# Patient Record
Sex: MALE | Race: ASIAN | HISPANIC OR LATINO
[De-identification: names, ages, dates, MRNs, and addresses within clinical notes are randomized per-mention and may not be internally consistent; named-entity substitution may affect disease eponyms.]

---

## 2017-12-23 ENCOUNTER — APPOINTMENT (OUTPATIENT)
Dept: MRI IMAGING | Facility: HOSPITAL | Age: 72
End: 2017-12-23
Payer: MEDICARE

## 2017-12-23 ENCOUNTER — OUTPATIENT (OUTPATIENT)
Dept: OUTPATIENT SERVICES | Facility: HOSPITAL | Age: 72
LOS: 1 days | End: 2017-12-23
Payer: MEDICARE

## 2017-12-23 DIAGNOSIS — M47.22 OTHER SPONDYLOSIS WITH RADICULOPATHY, CERVICAL REGION: ICD-10-CM

## 2017-12-23 DIAGNOSIS — M50.122 CERVICAL DISC DISORDER AT C5-C6 LEVEL WITH RADICULOPATHY: ICD-10-CM

## 2017-12-23 DIAGNOSIS — M50.123 CERVICAL DISC DISORDER AT C6-C7 LEVEL WITH RADICULOPATHY: ICD-10-CM

## 2017-12-23 DIAGNOSIS — M50.121 CERVICAL DISC DISORDER AT C4-C5 LEVEL WITH RADICULOPATHY: ICD-10-CM

## 2017-12-23 DIAGNOSIS — M54.16 RADICULOPATHY, LUMBAR REGION: ICD-10-CM

## 2017-12-23 PROCEDURE — 72148 MRI LUMBAR SPINE W/O DYE: CPT

## 2017-12-23 PROCEDURE — 72148 MRI LUMBAR SPINE W/O DYE: CPT | Mod: 26

## 2017-12-23 PROCEDURE — 72141 MRI NECK SPINE W/O DYE: CPT | Mod: 26

## 2017-12-23 PROCEDURE — 72141 MRI NECK SPINE W/O DYE: CPT

## 2019-03-04 ENCOUNTER — APPOINTMENT (OUTPATIENT)
Age: 74
End: 2019-03-04
Payer: MEDICARE

## 2019-03-04 ENCOUNTER — OUTPATIENT (OUTPATIENT)
Dept: OUTPATIENT SERVICES | Facility: HOSPITAL | Age: 74
LOS: 1 days | End: 2019-03-04
Payer: MEDICARE

## 2019-03-04 DIAGNOSIS — G45.1 CAROTID ARTERY SYNDROME (HEMISPHERIC): ICD-10-CM

## 2019-03-04 PROCEDURE — 93880 EXTRACRANIAL BILAT STUDY: CPT | Mod: 26

## 2019-03-04 PROCEDURE — 93880 EXTRACRANIAL BILAT STUDY: CPT

## 2019-08-22 ENCOUNTER — APPOINTMENT (OUTPATIENT)
Dept: MRI IMAGING | Facility: HOSPITAL | Age: 74
End: 2019-08-22
Payer: MEDICARE

## 2019-08-22 ENCOUNTER — OUTPATIENT (OUTPATIENT)
Dept: OUTPATIENT SERVICES | Facility: HOSPITAL | Age: 74
LOS: 1 days | End: 2019-08-22
Payer: MEDICARE

## 2019-08-22 DIAGNOSIS — Z00.8 ENCOUNTER FOR OTHER GENERAL EXAMINATION: ICD-10-CM

## 2019-08-22 PROCEDURE — 72148 MRI LUMBAR SPINE W/O DYE: CPT

## 2019-08-22 PROCEDURE — 72148 MRI LUMBAR SPINE W/O DYE: CPT | Mod: 26

## 2020-07-22 ENCOUNTER — APPOINTMENT (OUTPATIENT)
Dept: MRI IMAGING | Facility: HOSPITAL | Age: 75
End: 2020-07-22
Payer: MEDICARE

## 2020-07-22 ENCOUNTER — OUTPATIENT (OUTPATIENT)
Dept: OUTPATIENT SERVICES | Facility: HOSPITAL | Age: 75
LOS: 1 days | End: 2020-07-22
Payer: MEDICARE

## 2020-07-22 DIAGNOSIS — M54.16 RADICULOPATHY, LUMBAR REGION: ICD-10-CM

## 2020-07-22 PROCEDURE — 72148 MRI LUMBAR SPINE W/O DYE: CPT

## 2020-07-22 PROCEDURE — 72148 MRI LUMBAR SPINE W/O DYE: CPT | Mod: 26

## 2020-11-16 ENCOUNTER — NON-APPOINTMENT (OUTPATIENT)
Age: 75
End: 2020-11-16

## 2020-11-18 ENCOUNTER — APPOINTMENT (OUTPATIENT)
Dept: NEUROLOGY | Facility: CLINIC | Age: 75
End: 2020-11-18
Payer: MEDICARE

## 2020-11-18 VITALS
RESPIRATION RATE: 16 BRPM | HEIGHT: 64 IN | HEART RATE: 71 BPM | BODY MASS INDEX: 29.37 KG/M2 | DIASTOLIC BLOOD PRESSURE: 89 MMHG | WEIGHT: 172 LBS | SYSTOLIC BLOOD PRESSURE: 162 MMHG

## 2020-11-18 VITALS — TEMPERATURE: 97.9 F

## 2020-11-18 DIAGNOSIS — H40.9 UNSPECIFIED GLAUCOMA: ICD-10-CM

## 2020-11-18 DIAGNOSIS — Z78.9 OTHER SPECIFIED HEALTH STATUS: ICD-10-CM

## 2020-11-18 DIAGNOSIS — Z80.9 FAMILY HISTORY OF MALIGNANT NEOPLASM, UNSPECIFIED: ICD-10-CM

## 2020-11-18 DIAGNOSIS — Z87.891 PERSONAL HISTORY OF NICOTINE DEPENDENCE: ICD-10-CM

## 2020-11-18 DIAGNOSIS — I10 ESSENTIAL (PRIMARY) HYPERTENSION: ICD-10-CM

## 2020-11-18 DIAGNOSIS — S06.9X9A UNSPECIFIED INTRACRANIAL INJURY WITH LOSS OF CONSCIOUSNESS OF UNSPECIFIED DURATION, INITIAL ENCOUNTER: ICD-10-CM

## 2020-11-18 DIAGNOSIS — Z82.49 FAMILY HISTORY OF ISCHEMIC HEART DISEASE AND OTHER DISEASES OF THE CIRCULATORY SYSTEM: ICD-10-CM

## 2020-11-18 DIAGNOSIS — E78.5 HYPERLIPIDEMIA, UNSPECIFIED: ICD-10-CM

## 2020-11-18 DIAGNOSIS — E11.9 TYPE 2 DIABETES MELLITUS W/OUT COMPLICATIONS: ICD-10-CM

## 2020-11-18 PROCEDURE — 99214 OFFICE O/P EST MOD 30 MIN: CPT

## 2020-11-18 RX ORDER — METFORMIN HYDROCHLORIDE 500 MG/1
500 TABLET, COATED ORAL TWICE DAILY
Refills: 0 | Status: ACTIVE | COMMUNITY

## 2020-11-18 RX ORDER — TAMSULOSIN HYDROCHLORIDE 0.4 MG/1
0.4 CAPSULE ORAL DAILY
Refills: 0 | Status: ACTIVE | COMMUNITY
Start: 1900-01-01 | End: 1900-01-01

## 2020-11-18 RX ORDER — LOSARTAN POTASSIUM 50 MG/1
50 TABLET, FILM COATED ORAL DAILY
Refills: 0 | Status: ACTIVE | COMMUNITY

## 2020-11-18 RX ORDER — ATORVASTATIN CALCIUM 10 MG/1
10 TABLET, FILM COATED ORAL DAILY
Refills: 0 | Status: ACTIVE | COMMUNITY

## 2020-11-18 NOTE — CONSULT LETTER
[Dear  ___] : Dear  [unfilled], [Consult Letter:] : I had the pleasure of evaluating your patient, [unfilled]. [Please see my note below.] : Please see my note below. [Consult Closing:] : Thank you very much for allowing me to participate in the care of this patient.  If you have any questions, please do not hesitate to contact me. [Sincerely,] : Sincerely, [FreeTextEntry3] : Cristian Garland MD\par  [DrCeleste  ___] : Dr. MARTINEZ

## 2020-11-18 NOTE — PHYSICAL EXAM
[FreeTextEntry1] : Constitutional:  Patient was well-developed, well-nourished and in no acute distress. \par \par Head:  Normocephalic, atraumatic. Tympanic membranes were clear. \par \par Neck:  Supple with full range of motion. \par \par Cardiovascular:  Cardiac rhythm was regular without murmur. There were no carotid bruits. Peripheral pulses were full and symmetric. \par \par Respiratory:  Lungs were clear. \par \par Abdomen:  Soft and nontender. \par \par Spine:  Nontender.  There was no pain on straight leg raising.  Wade's maneuver was negative.\par \par Skin:  There were no rashes. \par \par NEUROLOGICAL EXAMINATION:\par \par Mental Status: She was alert and oriented. Speech was fluent. There was no dysarthria. \par \par Cranial Nerves: \par \par II: Pupils were surgical.  He could finger count bilaterally.   Visual fields were full.  Fundi were not visualized.\par \par III, IV, VI:  Eye movements were full without nystagmus. \par \par V: Facial sensation was intact. \par \par VII: Facial strength was normal. \par \par VIII: Hearing was diminished bilaterally.\par \par IX, X: Palatal movement was normal. Phonation was normal. \par \par XI: Sternocleidomastoids and trapezii were normal. \par \par XII: Tongue was midline and movements normal. There was no lingual atrophy or fasciculations. \par \par Motor Examination: Muscle bulk, tone and strength were normal.  He had difficulty abducting his toes.  He was able to stand on his toes and heels without difficulty.\par \par Sensory Examination: Pinprick, vibration and joint position sense were intact. \par \par Reflexes: DTRs were 2 in the upper extremities, and 2+ at the knees.  The right ankle jerk was 2 and the left was 1+.\par \par Plantar Responses: Plantar responses were flexor. \par \par Coordination/Cerebellar Function: There was no dysmetria on finger to nose or heel to shin testing. \par \par Gait/Stance: Gait was normal. Romberg was negative.\par

## 2020-11-18 NOTE — HISTORY OF PRESENT ILLNESS
[FreeTextEntry1] : \par Mr. Nick Rainey was last evaluated on July 21, 2020 via telemedicine. He is a 75-year-old right-handed gentleman with lumbar and cervical spondylosis.  He was experiencing a recent exacerbation of left-sided lumbar radicular pain, tingling and weakness.  I suspected compromise of the left L4 and/or 5 nerve roots.\par \par MRI of the lumbar spine revealed severe stenosis at L4-5.  There was compression particularly of the left L5 nerve root.\par \par He was evaluated by Dr. Jin King, a  pain management specialist.  On September 10th, he underwent a lumbar epidural steroid injection.  He experienced transient pain relief.  His symptoms unfortunately recurred.  He describes mid low back pain radiating to the left buttock and down his leg to the foot.  His left leg feels weak.  He sometimes feels like falling.  His right leg is not affected.  He denies sphincteric difficulties.  His symptoms are worse when he rises in the morning and improve during the day.\par \par Dr. King has spoken to him about minimally invasive lumbar decompression.

## 2020-11-18 NOTE — ASSESSMENT
[FreeTextEntry1] : Mr. Rainey is a 75-year-old who suffers from severe lumbar spondylosis, stenosis and neurogenic claudication.  His motor and sensory systems appear intact.  I discussed treatment options including another lumbar epidural steroid injection, minimally invasive lumbar decompression and neurosurgical consultation for definitive decompression.  He wishes to pursue a more conservative approach.  He will consult with Dr. King regarding a repeat epidural or minimally invasive decompression.  He will be reevaluated in my office in 2 months.  Telephone contact will be maintained in the meantime.

## 2021-01-20 ENCOUNTER — APPOINTMENT (OUTPATIENT)
Dept: NEUROLOGY | Facility: CLINIC | Age: 76
End: 2021-01-20

## 2021-03-24 ENCOUNTER — APPOINTMENT (OUTPATIENT)
Dept: NEUROLOGY | Facility: CLINIC | Age: 76
End: 2021-03-24
Payer: MEDICARE

## 2021-03-24 VITALS
SYSTOLIC BLOOD PRESSURE: 147 MMHG | HEIGHT: 64 IN | HEART RATE: 76 BPM | BODY MASS INDEX: 29.02 KG/M2 | DIASTOLIC BLOOD PRESSURE: 83 MMHG | WEIGHT: 170 LBS

## 2021-03-24 PROCEDURE — 99213 OFFICE O/P EST LOW 20 MIN: CPT

## 2021-03-24 RX ORDER — DUTASTERIDE 0.5 MG/1
CAPSULE, LIQUID FILLED ORAL
Refills: 0 | Status: ACTIVE | COMMUNITY

## 2021-03-24 NOTE — PHYSICAL EXAM
[FreeTextEntry1] : Constitutional:  Patient was well-developed, well-nourished and in no acute distress. \par \par Head:  Normocephalic, atraumatic. Tympanic membranes were clear. \par \par Neck:  Supple with full range of motion. \par \par Cardiovascular:  Cardiac rhythm was regular without murmur. There were no carotid bruits. Peripheral pulses were full and symmetric. \par \par Respiratory:  Lungs were clear. \par \par Abdomen:  Soft and nontender. \par \par Spine:  Nontender.  There was no pain on straight leg raising.  Wade's maneuver was negative.\par \par Skin:  There were no rashes. \par \par NEUROLOGICAL EXAMINATION:\par \par Mental Status: She was alert and oriented. Speech was fluent. There was no dysarthria. \par \par Cranial Nerves: \par \par II: Pupils were surgical.  He could finger count bilaterally.   Visual fields were full.  Fundi were not visualized.\par \par III, IV, VI:  Eye movements were full without nystagmus. \par \par V: Facial sensation was intact. \par \par VII: Facial strength was normal. \par \par VIII: Hearing was diminished bilaterally.\par \par IX, X: Palatal movement was normal. Phonation was normal. \par \par XI: Sternocleidomastoids and trapezii were normal. \par \par XII: Tongue was midline and movements normal. There was no lingual atrophy or fasciculations. \par \par Motor Examination: Muscle bulk, tone and strength were normal.  He was able to stand on his toes and heels without difficulty.\par \par Sensory Examination: Pinprick, vibration and joint position sense were intact. \par \par Reflexes: DTRs were 2 in the upper extremities, and 2+ at the knees.  The right ankle jerk was 1+ in the left was absent.\par \par Plantar Responses: Plantar responses were flexor. \par \par Coordination/Cerebellar Function: There was no dysmetria on finger to nose or heel to shin testing. \par \par Gait/Stance: Gait was normal. Romberg was negative.\par

## 2021-03-24 NOTE — ASSESSMENT
[FreeTextEntry1] : Mr. Rainey is a 75-year-old who suffers from severe lumbar spondylosis, stenosis and neurogenic claudication.  Since his last lumbar epidural injection in December 2020, he seems to be doing fairly well.  He has only occasional tingling in a left lumbar radicular distribution.  I suggested cautious observation and continued back care and strengthening.  He will be reevaluated in 6 months.  Telephone contact will be maintained in the meantime.

## 2021-03-24 NOTE — HISTORY OF PRESENT ILLNESS
[FreeTextEntry1] : \par Mr. Nick Rainey was last evaluated on November 18, 2020. He is a 75-year-old right-handed gentleman with lumbar and cervical spondylosis.  He was experiencing a recent exacerbation of left-sided lumbar radicular pain, tingling and weakness.  I suspected compromise of the left L4 and/or 5 nerve roots.\par \par MRI of the lumbar spine revealed severe stenosis at L4-5.  There was compression particularly of the left L5 nerve root.\par \par He underwent a second lumbar epidural injection on December 3rd.  The first was performed on September 10th.  He seems to be doing fairly well.  He has occasional tingling in his left low back and down the leg.  His left leg is mildly weak.  He is performing his own exercise regimen.  He complains of nocturia and dry eyes.  Overall, he is doing well.

## 2021-09-23 ENCOUNTER — APPOINTMENT (OUTPATIENT)
Dept: MRI IMAGING | Facility: HOSPITAL | Age: 76
End: 2021-09-23
Payer: MEDICARE

## 2021-09-23 ENCOUNTER — OUTPATIENT (OUTPATIENT)
Dept: OUTPATIENT SERVICES | Facility: HOSPITAL | Age: 76
LOS: 1 days | End: 2021-09-23
Payer: MEDICARE

## 2021-09-23 DIAGNOSIS — Z00.8 ENCOUNTER FOR OTHER GENERAL EXAMINATION: ICD-10-CM

## 2021-09-23 PROCEDURE — 70553 MRI BRAIN STEM W/O & W/DYE: CPT | Mod: 26,MH

## 2021-09-23 PROCEDURE — 70553 MRI BRAIN STEM W/O & W/DYE: CPT | Mod: MH

## 2021-09-23 PROCEDURE — A9579: CPT

## 2021-09-29 ENCOUNTER — APPOINTMENT (OUTPATIENT)
Dept: NEUROLOGY | Facility: CLINIC | Age: 76
End: 2021-09-29
Payer: MEDICARE

## 2021-09-29 VITALS
WEIGHT: 170 LBS | DIASTOLIC BLOOD PRESSURE: 80 MMHG | HEART RATE: 61 BPM | BODY MASS INDEX: 29.02 KG/M2 | SYSTOLIC BLOOD PRESSURE: 160 MMHG | HEIGHT: 64 IN

## 2021-09-29 PROCEDURE — 99213 OFFICE O/P EST LOW 20 MIN: CPT

## 2021-09-29 NOTE — PHYSICAL EXAM
[FreeTextEntry1] : Constitutional:  Patient was well-developed, well-nourished and in no acute distress. \par \par Head:  Normocephalic, atraumatic. Tympanic membranes were clear. \par \par Neck:  Supple with full range of motion. \par \par Cardiovascular:  Cardiac rhythm was regular without murmur. There were no carotid bruits. Peripheral pulses were full and symmetric. \par \par Respiratory:  Lungs were clear. \par \par Abdomen:  Soft and nontender. \par \par Spine:  Nontender.  There was no pain on straight leg raising.  Wade's maneuver was negative.\par \par Skin:  There were no rashes. \par \par NEUROLOGICAL EXAMINATION:\par \par Mental Status: He was alert and oriented. Speech was fluent. There was no dysarthria. \par \par Cranial Nerves: \par \par II: Pupils were surgical.  He could finger count bilaterally.   Visual fields were full.  Fundi were not visualized.\par \par III, IV, VI:  Eye movements were full without nystagmus. \par \par V: Facial sensation was intact. \par \par VII: Facial strength was normal. \par \par VIII: Hearing was diminished bilaterally.\par \par IX, X: Palatal movement was normal. Phonation was normal. \par \par XI: Sternocleidomastoids and trapezii were normal. \par \par XII: Tongue was midline and movements normal. There was no lingual atrophy or fasciculations. \par \par Motor Examination: Muscle bulk, tone and strength were normal.  He was able to stand on his toes and heels without difficulty.\par \par Sensory Examination: Pinprick, vibration and joint position sense were intact. \par \par Reflexes: DTRs were 2 in the upper extremities, and 2+ at the knees.  The right ankle jerk was 1+ in the left was absent.\par \par Plantar Responses: Plantar responses were flexor. \par \par Coordination/Cerebellar Function: There was no dysmetria on finger to nose or heel to shin testing. \par \par Gait/Stance: Gait was normal. Romberg was negative. Tandem was mildly unsteady.\par

## 2021-09-29 NOTE — ASSESSMENT
[FreeTextEntry1] : Mr. Rainey is a 76-year-old who suffers from severe lumbar stenosis and radiculopathy and recent exacerbation of vestibular symptoms likely peripheral in etiology. He appears neurologically well compensated. I suggested a screening carotid Doppler, the last being performed 2-1/2 years ago. He will continue his cardiovascular prophylactic regimen. He will return to the office in 6 months for routine follow-up. Telephone contact will be maintained in the meantime.

## 2021-09-29 NOTE — HISTORY OF PRESENT ILLNESS
[FreeTextEntry1] : \par Mr. Nick Rainey returned to the office having been last seen on March 24, 2021. He is a 76-year-old right-handed gentleman with lumbar and cervical spondylosis.  He had been experiencing an exacerbation of left-sided lumbar radicular pain, tingling and weakness.  I suspected compromise of the left L4 and/or 5 nerve roots.  MRI of the lumbar spine revealed severe stenosis at L4-5.  There was compression particularly of the left L5 nerve root.\par \par He underwent a second lumbar epidural injection on December 3rd.  The first was performed on September 10th. His symptoms were much improved.\par \par He continues to do well from a lumbar spondylosis perspective. On occasion, he experiences low back discomfort upon awakening. He denies sciatic symptoms, weakness or numbness. He complains of nocturia x3 which he attributes to prostatism. He remains on tamsulosin and dutasteride.\par \par He experienced a recent bouts of vertigo which occurs in the morning. He has chronic hearing loss. He was evaluated by his otolaryngologist Dr. Adi Santana. 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 He underwent an MRI of the brain on September 23, 2021. That study revealed mild chronic bilateral inferior frontal encephalomalacia worse on the right likely due to traumatic brain injury. There was mild scattered chronic microvascular ischemic change in the periventricular white matter. His vestibular symptoms appear to have abated.

## 2021-09-29 NOTE — CONSULT LETTER
[Dear  ___] : Dear  [unfilled], [Consult Letter:] : I had the pleasure of evaluating your patient, [unfilled]. [Please see my note below.] : Please see my note below. [Consult Closing:] : Thank you very much for allowing me to participate in the care of this patient.  If you have any questions, please do not hesitate to contact me. [Sincerely,] : Sincerely, [FreeTextEntry3] : Cristian Garland MD\par  [DrCeleste  ___] : Dr. MARTINEZ [DrCeleste ___] : Dr. MARTINEZ

## 2021-10-19 ENCOUNTER — OUTPATIENT (OUTPATIENT)
Dept: OUTPATIENT SERVICES | Facility: HOSPITAL | Age: 76
LOS: 1 days | End: 2021-10-19
Payer: MEDICARE

## 2021-10-19 ENCOUNTER — APPOINTMENT (OUTPATIENT)
Dept: ULTRASOUND IMAGING | Facility: HOSPITAL | Age: 76
End: 2021-10-19
Payer: MEDICARE

## 2021-10-19 DIAGNOSIS — E11.9 TYPE 2 DIABETES MELLITUS WITHOUT COMPLICATIONS: ICD-10-CM

## 2021-10-19 DIAGNOSIS — E78.5 HYPERLIPIDEMIA, UNSPECIFIED: ICD-10-CM

## 2021-10-19 PROCEDURE — 93880 EXTRACRANIAL BILAT STUDY: CPT | Mod: 26

## 2021-10-19 PROCEDURE — 93880 EXTRACRANIAL BILAT STUDY: CPT

## 2021-10-22 ENCOUNTER — NON-APPOINTMENT (OUTPATIENT)
Age: 76
End: 2021-10-22

## 2022-01-08 ENCOUNTER — TRANSCRIPTION ENCOUNTER (OUTPATIENT)
Age: 77
End: 2022-01-08

## 2022-03-03 ENCOUNTER — INPATIENT (INPATIENT)
Facility: HOSPITAL | Age: 77
LOS: 3 days | Discharge: HOME | DRG: 696 | End: 2022-03-07
Attending: STUDENT IN AN ORGANIZED HEALTH CARE EDUCATION/TRAINING PROGRAM | Admitting: STUDENT IN AN ORGANIZED HEALTH CARE EDUCATION/TRAINING PROGRAM
Payer: MEDICARE

## 2022-03-03 VITALS
OXYGEN SATURATION: 98 % | RESPIRATION RATE: 17 BRPM | TEMPERATURE: 97 F | HEART RATE: 77 BPM | DIASTOLIC BLOOD PRESSURE: 106 MMHG | SYSTOLIC BLOOD PRESSURE: 182 MMHG | WEIGHT: 166.89 LBS

## 2022-03-03 DIAGNOSIS — R31.0 GROSS HEMATURIA: ICD-10-CM

## 2022-03-03 DIAGNOSIS — K59.00 CONSTIPATION, UNSPECIFIED: ICD-10-CM

## 2022-03-03 LAB
ALBUMIN SERPL ELPH-MCNC: 4.3 G/DL — SIGNIFICANT CHANGE UP (ref 3.3–5)
ALP SERPL-CCNC: 90 U/L — SIGNIFICANT CHANGE UP (ref 40–120)
ALT FLD-CCNC: 40 U/L — SIGNIFICANT CHANGE UP (ref 10–45)
ANION GAP SERPL CALC-SCNC: 6 MMOL/L — SIGNIFICANT CHANGE UP (ref 5–17)
AST SERPL-CCNC: 22 U/L — SIGNIFICANT CHANGE UP (ref 10–40)
BASOPHILS # BLD AUTO: 0.04 K/UL — SIGNIFICANT CHANGE UP (ref 0–0.2)
BASOPHILS NFR BLD AUTO: 0.6 % — SIGNIFICANT CHANGE UP (ref 0–2)
BILIRUB SERPL-MCNC: 0.6 MG/DL — SIGNIFICANT CHANGE UP (ref 0.2–1.2)
BUN SERPL-MCNC: 13 MG/DL — SIGNIFICANT CHANGE UP (ref 7–23)
CALCIUM SERPL-MCNC: 10.5 MG/DL — SIGNIFICANT CHANGE UP (ref 8.4–10.5)
CHLORIDE SERPL-SCNC: 104 MMOL/L — SIGNIFICANT CHANGE UP (ref 96–108)
CO2 SERPL-SCNC: 29 MMOL/L — SIGNIFICANT CHANGE UP (ref 22–31)
CREAT SERPL-MCNC: 0.97 MG/DL — SIGNIFICANT CHANGE UP (ref 0.5–1.3)
EGFR: 81 ML/MIN/1.73M2 — SIGNIFICANT CHANGE UP
EOSINOPHIL # BLD AUTO: 0.14 K/UL — SIGNIFICANT CHANGE UP (ref 0–0.5)
EOSINOPHIL NFR BLD AUTO: 2 % — SIGNIFICANT CHANGE UP (ref 0–6)
GLUCOSE BLDC GLUCOMTR-MCNC: 137 MG/DL — HIGH (ref 70–99)
GLUCOSE BLDC GLUCOMTR-MCNC: 170 MG/DL — HIGH (ref 70–99)
GLUCOSE SERPL-MCNC: 150 MG/DL — HIGH (ref 70–99)
HCT VFR BLD CALC: 42.9 % — SIGNIFICANT CHANGE UP (ref 39–50)
HCT VFR BLD CALC: 47.1 % — SIGNIFICANT CHANGE UP (ref 39–50)
HGB BLD-MCNC: 14.3 G/DL — SIGNIFICANT CHANGE UP (ref 13–17)
HGB BLD-MCNC: 15.5 G/DL — SIGNIFICANT CHANGE UP (ref 13–17)
IMM GRANULOCYTES NFR BLD AUTO: 0.1 % — SIGNIFICANT CHANGE UP (ref 0–1.5)
LYMPHOCYTES # BLD AUTO: 0.84 K/UL — LOW (ref 1–3.3)
LYMPHOCYTES # BLD AUTO: 11.8 % — LOW (ref 13–44)
MCHC RBC-ENTMCNC: 30.8 PG — SIGNIFICANT CHANGE UP (ref 27–34)
MCHC RBC-ENTMCNC: 31.6 PG — SIGNIFICANT CHANGE UP (ref 27–34)
MCHC RBC-ENTMCNC: 32.9 GM/DL — SIGNIFICANT CHANGE UP (ref 32–36)
MCHC RBC-ENTMCNC: 33.3 GM/DL — SIGNIFICANT CHANGE UP (ref 32–36)
MCV RBC AUTO: 93.5 FL — SIGNIFICANT CHANGE UP (ref 80–100)
MCV RBC AUTO: 94.9 FL — SIGNIFICANT CHANGE UP (ref 80–100)
MONOCYTES # BLD AUTO: 0.49 K/UL — SIGNIFICANT CHANGE UP (ref 0–0.9)
MONOCYTES NFR BLD AUTO: 6.9 % — SIGNIFICANT CHANGE UP (ref 2–14)
NEUTROPHILS # BLD AUTO: 5.57 K/UL — SIGNIFICANT CHANGE UP (ref 1.8–7.4)
NEUTROPHILS NFR BLD AUTO: 78.6 % — HIGH (ref 43–77)
NRBC # BLD: 0 /100 WBCS — SIGNIFICANT CHANGE UP (ref 0–0)
NRBC # BLD: 0 /100 WBCS — SIGNIFICANT CHANGE UP (ref 0–0)
PLATELET # BLD AUTO: 221 K/UL — SIGNIFICANT CHANGE UP (ref 150–400)
PLATELET # BLD AUTO: 224 K/UL — SIGNIFICANT CHANGE UP (ref 150–400)
POTASSIUM SERPL-MCNC: 5 MMOL/L — SIGNIFICANT CHANGE UP (ref 3.5–5.3)
POTASSIUM SERPL-SCNC: 5 MMOL/L — SIGNIFICANT CHANGE UP (ref 3.5–5.3)
PROT SERPL-MCNC: 8 G/DL — SIGNIFICANT CHANGE UP (ref 6–8.3)
RBC # BLD: 4.52 M/UL — SIGNIFICANT CHANGE UP (ref 4.2–5.8)
RBC # BLD: 5.04 M/UL — SIGNIFICANT CHANGE UP (ref 4.2–5.8)
RBC # FLD: 13.3 % — SIGNIFICANT CHANGE UP (ref 10.3–14.5)
RBC # FLD: 13.6 % — SIGNIFICANT CHANGE UP (ref 10.3–14.5)
SARS-COV-2 RNA SPEC QL NAA+PROBE: SIGNIFICANT CHANGE UP
SODIUM SERPL-SCNC: 139 MMOL/L — SIGNIFICANT CHANGE UP (ref 135–145)
WBC # BLD: 12 K/UL — HIGH (ref 3.8–10.5)
WBC # BLD: 7.09 K/UL — SIGNIFICANT CHANGE UP (ref 3.8–10.5)
WBC # FLD AUTO: 12 K/UL — HIGH (ref 3.8–10.5)
WBC # FLD AUTO: 7.09 K/UL — SIGNIFICANT CHANGE UP (ref 3.8–10.5)

## 2022-03-03 PROCEDURE — 71045 X-RAY EXAM CHEST 1 VIEW: CPT | Mod: 26

## 2022-03-03 PROCEDURE — 74177 CT ABD & PELVIS W/CONTRAST: CPT | Mod: 26,MA

## 2022-03-03 PROCEDURE — 93010 ELECTROCARDIOGRAM REPORT: CPT | Mod: 76

## 2022-03-03 PROCEDURE — 99223 1ST HOSP IP/OBS HIGH 75: CPT

## 2022-03-03 PROCEDURE — 99285 EMERGENCY DEPT VISIT HI MDM: CPT

## 2022-03-03 RX ORDER — METRONIDAZOLE 500 MG
500 TABLET ORAL ONCE
Refills: 0 | Status: DISCONTINUED | OUTPATIENT
Start: 2022-03-03 | End: 2022-03-03

## 2022-03-03 RX ORDER — SODIUM CHLORIDE 9 MG/ML
1000 INJECTION INTRAMUSCULAR; INTRAVENOUS; SUBCUTANEOUS ONCE
Refills: 0 | Status: COMPLETED | OUTPATIENT
Start: 2022-03-03 | End: 2022-03-03

## 2022-03-03 RX ORDER — IOHEXOL 300 MG/ML
30 INJECTION, SOLUTION INTRAVENOUS ONCE
Refills: 0 | Status: COMPLETED | OUTPATIENT
Start: 2022-03-03 | End: 2022-03-03

## 2022-03-03 RX ORDER — POLYETHYLENE GLYCOL 3350 17 G/17G
17 POWDER, FOR SOLUTION ORAL DAILY
Refills: 0 | Status: DISCONTINUED | OUTPATIENT
Start: 2022-03-03 | End: 2022-03-04

## 2022-03-03 RX ORDER — SENNA PLUS 8.6 MG/1
2 TABLET ORAL AT BEDTIME
Refills: 0 | Status: DISCONTINUED | OUTPATIENT
Start: 2022-03-03 | End: 2022-03-07

## 2022-03-03 RX ORDER — ATORVASTATIN CALCIUM 80 MG/1
10 TABLET, FILM COATED ORAL AT BEDTIME
Refills: 0 | Status: DISCONTINUED | OUTPATIENT
Start: 2022-03-03 | End: 2022-03-07

## 2022-03-03 RX ORDER — LOSARTAN POTASSIUM 100 MG/1
50 TABLET, FILM COATED ORAL DAILY
Refills: 0 | Status: DISCONTINUED | OUTPATIENT
Start: 2022-03-04 | End: 2022-03-07

## 2022-03-03 RX ORDER — MULTIVIT WITH MIN/MFOLATE/K2 340-15/3 G
1 POWDER (GRAM) ORAL ONCE
Refills: 0 | Status: COMPLETED | OUTPATIENT
Start: 2022-03-03 | End: 2022-03-03

## 2022-03-03 RX ORDER — TAMSULOSIN HYDROCHLORIDE 0.4 MG/1
0.4 CAPSULE ORAL AT BEDTIME
Refills: 0 | Status: DISCONTINUED | OUTPATIENT
Start: 2022-03-03 | End: 2022-03-04

## 2022-03-03 RX ORDER — MINERAL OIL
133 OIL (ML) MISCELLANEOUS ONCE
Refills: 0 | Status: COMPLETED | OUTPATIENT
Start: 2022-03-03 | End: 2022-03-03

## 2022-03-03 RX ORDER — CIPROFLOXACIN LACTATE 400MG/40ML
500 VIAL (ML) INTRAVENOUS ONCE
Refills: 0 | Status: DISCONTINUED | OUTPATIENT
Start: 2022-03-03 | End: 2022-03-03

## 2022-03-03 RX ORDER — ATORVASTATIN CALCIUM 80 MG/1
10 TABLET, FILM COATED ORAL AT BEDTIME
Refills: 0 | Status: DISCONTINUED | OUTPATIENT
Start: 2022-03-03 | End: 2022-03-03

## 2022-03-03 RX ADMIN — ATORVASTATIN CALCIUM 10 MILLIGRAM(S): 80 TABLET, FILM COATED ORAL at 23:03

## 2022-03-03 RX ADMIN — SENNA PLUS 2 TABLET(S): 8.6 TABLET ORAL at 19:13

## 2022-03-03 RX ADMIN — TAMSULOSIN HYDROCHLORIDE 0.4 MILLIGRAM(S): 0.4 CAPSULE ORAL at 19:13

## 2022-03-03 RX ADMIN — Medication 1 TABLET(S): at 15:26

## 2022-03-03 RX ADMIN — POLYETHYLENE GLYCOL 3350 17 GRAM(S): 17 POWDER, FOR SOLUTION ORAL at 19:13

## 2022-03-03 RX ADMIN — SODIUM CHLORIDE 1000 MILLILITER(S): 9 INJECTION INTRAMUSCULAR; INTRAVENOUS; SUBCUTANEOUS at 13:26

## 2022-03-03 RX ADMIN — Medication 1 BOTTLE: at 12:33

## 2022-03-03 RX ADMIN — IOHEXOL 30 MILLILITER(S): 300 INJECTION, SOLUTION INTRAVENOUS at 13:25

## 2022-03-03 RX ADMIN — Medication 133 MILLILITER(S): at 12:33

## 2022-03-03 NOTE — H&P ADULT - NSHPREVIEWOFSYSTEMS_GEN_ALL_CORE
REVIEW OF SYSTEMS:  CONSTITUTIONAL: No fever, weight loss, or fatigue  EYES: No eye pain, visual disturbances, or discharge  ENMT:  No difficulty hearing, tinnitus, vertigo; No sinus or throat pain  NECK: No pain or stiffness  BREASTS: No pain, masses, or nipple discharge  RESPIRATORY: No cough, wheezing, chills or hemoptysis; No shortness of breath  CARDIOVASCULAR: No chest pain, palpitations, dizziness, or leg swelling  GASTROINTESTINAL: No abdominal or epigastric pain. No nausea, vomiting, or hematemesis; No diarrhea or constipation. No melena or hematochezia.  GENITOURINARY: No dysuria, frequency, hematuria, or incontinence  NEUROLOGICAL: No headaches, memory loss, loss of strength, numbness, or tremors  SKIN: No itching, burning, rashes, or lesions   LYMPH NODES: No enlarged glands  ENDOCRINE: No heat or cold intolerance; No hair loss  MUSCULOSKELETAL: No joint pain or swelling; No muscle, back, or extremity pain  PSYCHIATRIC: No depression, anxiety, mood swings, or difficulty sleeping  HEME/LYMPH: No easy bruising, or bleeding gums  ALLERGY AND IMMUNOLOGIC: No hives or eczema    ALL ROS REVIEWED AND NORMAL EXCEPT AS STATED ABOVE REVIEW OF SYSTEMS:  CONSTITUTIONAL: No fever, weight loss, or fatigue  EYES: No eye pain, visual disturbances, or discharge  ENMT:  No difficulty hearing, tinnitus, vertigo; No sinus or throat pain  NECK: No pain or stiffness  BREASTS: No pain, masses, or nipple discharge  RESPIRATORY: No cough, wheezing, chills or hemoptysis; No shortness of breath  CARDIOVASCULAR: No chest pain, palpitations, dizziness, or leg swelling  GASTROINTESTINAL: +abdominal distention, No abdominal or epigastric pain. No nausea, vomiting, or hematemesis; No diarrhea or constipation. No melena or hematochezia.  GENITOURINARY: No dysuria, frequency, hematuria, or incontinence  NEUROLOGICAL: No headaches, memory loss, loss of strength, numbness, or tremors  SKIN: No itching, burning, rashes, or lesions   LYMPH NODES: No enlarged glands  ENDOCRINE: No heat or cold intolerance; No hair loss  MUSCULOSKELETAL: No joint pain or swelling; No muscle, back, or extremity pain  PSYCHIATRIC: No depression, anxiety, mood swings, or difficulty sleeping  HEME/LYMPH: No easy bruising, or bleeding gums  ALLERGY AND IMMUNOLOGIC: No hives or eczema    ALL ROS REVIEWED AND NORMAL EXCEPT AS STATED ABOVE

## 2022-03-03 NOTE — ED ADULT NURSE REASSESSMENT NOTE - NS ED NURSE REASSESS COMMENT FT1
Unsuccessful placement of guillen catheter as per MD orders after multiple attempts. MD Hanson made aware. Urology consulted. Will continue to monitor.

## 2022-03-03 NOTE — ED PROVIDER NOTE - CARE PROVIDER_API CALL
Amos Mendoza  UROLOGY  [address] [place] [address], [address]  [place], [place] [id number]  Phone: [phone]  Fax: [phone]  Follow Up Time:     Juan Diego Vanessa)  Gastroenterology; Internal Medicine  [address] [place] [address]  Phone: [phone]  Fax: [phone]  Follow Up Time:

## 2022-03-03 NOTE — ED ADULT TRIAGE NOTE - CHIEF COMPLAINT QUOTE
Pt c/o constipation x 1 week, has been having small hard stools Pt c/o constipation x 1 week, has been having small hard stools  ISAR positive

## 2022-03-03 NOTE — PATIENT PROFILE ADULT - FALL HARM RISK - HARM RISK INTERVENTIONS

## 2022-03-03 NOTE — H&P ADULT - NSHPLABSRESULTS_GEN_ALL_CORE
LABS:                        15.5   7.09  )-----------( 224      ( 03 Mar 2022 12:30 )             47.1     03-03    139  |  104  |  13  ----------------------------<  150<H>  5.0   |  29  |  0.97    Ca    10.5      03 Mar 2022 12:30    TPro  8.0  /  Alb  4.3  /  TBili  0.6  /  DBili  x   /  AST  22  /  ALT  40  /  AlkPhos  90  03-03         CAPILLARY BLOOD GLUCOSE                RADIOLOGY & ADDITIONAL TESTS:    Consultant(s) Notes Reviewed:  [x ] YES  [ ] NO  Care Discussed with Consultants/Other Providers [ x] YES  [ ] NO  Imaging Personally Reviewed:  [ ] YES  [ ] NO

## 2022-03-03 NOTE — ED PROVIDER NOTE - NSFOLLOWUPINSTRUCTIONS_ED_ALL_ED_FT
Follow up with your PMD within 1-2 days.  You will need to see both a Gastroenterologist (Colitis) and Urologist (Urinary retention) within the week- referrals above or you can call: Find a Physician helpline ([phone]) for assistance   Increase your fluids. Clear diet. Advance your diet slowly as tolerated.    Take Augmentin 875mg 1 tab 2x/day for 10 days.   Take Tylenol 650mg every 4-6 hours as needed for pain   Take all of your other medications as previously prescribed.  Worsening, continued or ANY new concerning symptoms return to the Emergency Department.      1. Colitis    WHAT YOU NEED TO KNOW:    Colitis is swelling and irritation of your colon. Colitis may be caused by ulcers or a problem with your immune system. Bacteria, a virus, or a parasite may also cause colitis. The cause may not be known. You may have diarrhea, abdominal pain, fever, or blood or mucus in your bowel movement.    DISCHARGE INSTRUCTIONS:    Return to the emergency department if:   •You have sudden trouble breathing.      •Your bowel movements are black or have blood in them.      •You have blood in your vomit.      •You have severe abdominal pain or your abdomen is swollen and feels hard.      •You have any of the following signs of dehydration: ?Dizziness or weakness      ?Dry mouth, cracked lips, or severe thirst      ?Fast heartbeat or breathing      ?Urinating very little or not at all        Call your doctor if:   •Your symptoms get worse or do not go away.      •You have a fever, chills, cough, or feel weak and achy.      •You suddenly lose weight without trying.      •You have questions or concerns about your condition or care.      Medicines:   •Medicines may be given to decrease inflammation in your colon and treat diarrhea.      •Take your medicine as directed. Contact your healthcare provider if you think your medicine is not helping or if you have side effects. Tell him of her if you are allergic to any medicine. Keep a list of the medicines, vitamins, and herbs you take. Include the amounts, and when and why you take them. Bring the list or the pill bottles to follow-up visits. Carry your medicine list with you in case of an emergency.      Manage your symptoms:   •Drink liquids as directed to help prevent dehydration. Good liquids to drink include water, juice, and broth. Ask how much liquid to drink each day. You may need to drink an oral rehydration solution (ORS). An ORS contains a balance of water, salt, and sugar to replace body fluids lost during diarrhea.      •Eat a variety of healthy foods. Healthy foods include fruits, vegetables, whole-grain breads, beans, low-fat dairy products, lean meats, and fish. You may need to eat several small meals throughout the day instead of large meals. Avoid spicy foods, caffeine, chocolate, and foods high in fat.      •Talk to your healthcare provider before you take NSAIDs. NSAIDs can cause worsen your symptoms if ulcers are causing your colitis.      •Start to exercise when you feel better. Regular exercise helps your bowels work normally. Ask about the best exercise plan for you.      Prevent the spread of germs:          •Wash your hands often. Wash your hands several times each day. Wash after you use the bathroom, change a child's diaper, and before you prepare or eat food. Use soap and water every time. Rub your soapy hands together, lacing your fingers. Wash the front and back of your hands, and in between your fingers. Use the fingers of one hand to scrub under the fingernails of the other hand. Wash for at least 20 seconds. Rinse with warm, running water for several seconds. Then dry your hands with a clean towel or paper towel. Use hand  that contains alcohol if soap and water are not available. Do not touch your eyes, nose, or mouth without washing your hands first.  Handwashing           •Cover a sneeze or cough. Use a tissue that covers your mouth and nose. Throw the tissue away in a trash can right away. Use the bend of your arm if a tissue is not available. Wash your hands well with soap and water or use a hand .      •Clean surfaces often. Clean doorknobs, countertops, cell phones, and other surfaces that are touched often. Use a disinfecting wipe, a single-use sponge, or a cloth you can wash and reuse. Use disinfecting  if you do not have wipes. You can create a disinfecting  by mixing 1 part bleach with 10 parts water.      •Ask about vaccines you may need. Vaccines help prevent disease caused by some viruses and bacteria. Get the influenza (flu) vaccine as soon as recommended each year. The flu vaccine is usually available starting in September or October. Flu viruses change, so it is important to get a flu vaccine every year. Get the pneumonia vaccine if recommended. This vaccine is usually recommended every 5 years. Your provider will tell you when to get this vaccine, if needed. Your healthcare provider can tell you if you should get other vaccines, and when to get them.      Follow up with your doctor as directed: You may need to return for a colonoscopy or other tests. Write down how often you have a bowel movements and what they look like. Bring this to your follow-up visits. Write down your questions so you remember to ask them during your visits.      2.   Acute Urinary Retention, Male       Acute urinary retention is when a person cannot pee (urinate) at all, or can only pee a little. This can come on all of a sudden. If it is not treated, it can lead to kidney problems or other serious problems.      What are the causes?    •A problem with the tube that drains the bladder (urethra).      •Problems with the nerves in the bladder.      •Tumors.      •Certain medicines.      •An infection.      •Having trouble pooping (constipation).        What increases the risk?    Older men are more at risk because their prostate gland may become larger as they age. Other conditions also can increase risk. These include:  •Diseases, such as multiple sclerosis.      •Injury to the spinal cord.      •Diabetes.      •A condition that affects the way the brain works, such as dementia.      •Holding back urine due to trauma or because you do not want to use the bathroom.        What are the signs or symptoms?    •Trouble peeing.      •Pain in the lower belly.        How is this treated?    Treatment for this condition may include:  •Medicines.      •Placing a thin, germ-free tube (catheter) into the bladder to drain pee out of the body.      •Therapy to treat mental health conditions.      •Treatment for conditions that may cause this.      If needed, you may be treated in the hospital for kidney problems or to manage other problems.      Follow these instructions at home:    Medicines     •Take over-the-counter and prescription medicines only as told by your doctor. Ask your doctor what medicines you should stay away from.       •If you were given an antibiotic medicine, take it as told by your doctor. Do not stop taking it, even if you start to feel better.      General instructions     • Do not smoke or use any products that contain nicotine or tobacco. If you need help quitting, ask your doctor.      •Drink enough fluid to keep your pee pale yellow.      •If you were sent home with a tube that drains the bladder, take care of it as told by your doctor.      •Watch for changes in your symptoms. Tell your doctor about them.      •If told, keep track of changes in your blood pressure at home. Tell your doctor about them.      •Keep all follow-up visits.        Contact a doctor if:    •You have spasms in your bladder that you cannot stop.      •You leak pee when you have spasms.        Get help right away if:    •You have chills or a fever.      •You have blood in your pee.    •You have a tube that drains pee from the bladder and these things happen:  •The tube stops draining pee.      •The tube falls out.          Summary    •Acute urinary retention is when you cannot pee at all or you pee too little.      •If this condition is not treated, it can lead to kidney problems or other serious problems.      •If you were sent home with a tube (catheter) that drains the bladder, take care of it as told by your doctor.      •Watch for changes in your symptoms. Tell your doctor about them.      This information is not intended to replace advice given to you by your health care provider. Make sure you discuss any questions you have with your health care provider. Follow up with your PMD within 1-2 days.    UROLOGY APPT- DR. MCALLISTER  THURSDAY 3/10 11am      You will need to see both a Gastroenterologist (Colitis) and Urologist (Urinary retention) within the week- referrals above or you can call: Find a Physician helpline ([phone]) for assistance   Increase your fluids. Clear diet. Advance your diet slowly as tolerated.    Take Augmentin 875mg 1 tab 2x/day for 10 days.   Take Tylenol 650mg every 4-6 hours as needed for pain   Take all of your other medications as previously prescribed.  Worsening, continued or ANY new concerning symptoms return to the Emergency Department.      1. Colitis    WHAT YOU NEED TO KNOW:    Colitis is swelling and irritation of your colon. Colitis may be caused by ulcers or a problem with your immune system. Bacteria, a virus, or a parasite may also cause colitis. The cause may not be known. You may have diarrhea, abdominal pain, fever, or blood or mucus in your bowel movement.    DISCHARGE INSTRUCTIONS:    Return to the emergency department if:   •You have sudden trouble breathing.      •Your bowel movements are black or have blood in them.      •You have blood in your vomit.      •You have severe abdominal pain or your abdomen is swollen and feels hard.      •You have any of the following signs of dehydration: ?Dizziness or weakness      ?Dry mouth, cracked lips, or severe thirst      ?Fast heartbeat or breathing      ?Urinating very little or not at all        Call your doctor if:   •Your symptoms get worse or do not go away.      •You have a fever, chills, cough, or feel weak and achy.      •You suddenly lose weight without trying.      •You have questions or concerns about your condition or care.      Medicines:   •Medicines may be given to decrease inflammation in your colon and treat diarrhea.      •Take your medicine as directed. Contact your healthcare provider if you think your medicine is not helping or if you have side effects. Tell him of her if you are allergic to any medicine. Keep a list of the medicines, vitamins, and herbs you take. Include the amounts, and when and why you take them. Bring the list or the pill bottles to follow-up visits. Carry your medicine list with you in case of an emergency.      Manage your symptoms:   •Drink liquids as directed to help prevent dehydration. Good liquids to drink include water, juice, and broth. Ask how much liquid to drink each day. You may need to drink an oral rehydration solution (ORS). An ORS contains a balance of water, salt, and sugar to replace body fluids lost during diarrhea.      •Eat a variety of healthy foods. Healthy foods include fruits, vegetables, whole-grain breads, beans, low-fat dairy products, lean meats, and fish. You may need to eat several small meals throughout the day instead of large meals. Avoid spicy foods, caffeine, chocolate, and foods high in fat.      •Talk to your healthcare provider before you take NSAIDs. NSAIDs can cause worsen your symptoms if ulcers are causing your colitis.      •Start to exercise when you feel better. Regular exercise helps your bowels work normally. Ask about the best exercise plan for you.      Prevent the spread of germs:          •Wash your hands often. Wash your hands several times each day. Wash after you use the bathroom, change a child's diaper, and before you prepare or eat food. Use soap and water every time. Rub your soapy hands together, lacing your fingers. Wash the front and back of your hands, and in between your fingers. Use the fingers of one hand to scrub under the fingernails of the other hand. Wash for at least 20 seconds. Rinse with warm, running water for several seconds. Then dry your hands with a clean towel or paper towel. Use hand  that contains alcohol if soap and water are not available. Do not touch your eyes, nose, or mouth without washing your hands first.  Handwashing           •Cover a sneeze or cough. Use a tissue that covers your mouth and nose. Throw the tissue away in a trash can right away. Use the bend of your arm if a tissue is not available. Wash your hands well with soap and water or use a hand .      •Clean surfaces often. Clean doorknobs, countertops, cell phones, and other surfaces that are touched often. Use a disinfecting wipe, a single-use sponge, or a cloth you can wash and reuse. Use disinfecting  if you do not have wipes. You can create a disinfecting  by mixing 1 part bleach with 10 parts water.      •Ask about vaccines you may need. Vaccines help prevent disease caused by some viruses and bacteria. Get the influenza (flu) vaccine as soon as recommended each year. The flu vaccine is usually available starting in September or October. Flu viruses change, so it is important to get a flu vaccine every year. Get the pneumonia vaccine if recommended. This vaccine is usually recommended every 5 years. Your provider will tell you when to get this vaccine, if needed. Your healthcare provider can tell you if you should get other vaccines, and when to get them.      Follow up with your doctor as directed: You may need to return for a colonoscopy or other tests. Write down how often you have a bowel movements and what they look like. Bring this to your follow-up visits. Write down your questions so you remember to ask them during your visits.      2.   Acute Urinary Retention, Male       Acute urinary retention is when a person cannot pee (urinate) at all, or can only pee a little. This can come on all of a sudden. If it is not treated, it can lead to kidney problems or other serious problems.      What are the causes?    •A problem with the tube that drains the bladder (urethra).      •Problems with the nerves in the bladder.      •Tumors.      •Certain medicines.      •An infection.      •Having trouble pooping (constipation).        What increases the risk?    Older men are more at risk because their prostate gland may become larger as they age. Other conditions also can increase risk. These include:  •Diseases, such as multiple sclerosis.      •Injury to the spinal cord.      •Diabetes.      •A condition that affects the way the brain works, such as dementia.      •Holding back urine due to trauma or because you do not want to use the bathroom.        What are the signs or symptoms?    •Trouble peeing.      •Pain in the lower belly.        How is this treated?    Treatment for this condition may include:  •Medicines.      •Placing a thin, germ-free tube (catheter) into the bladder to drain pee out of the body.      •Therapy to treat mental health conditions.      •Treatment for conditions that may cause this.      If needed, you may be treated in the hospital for kidney problems or to manage other problems.      Follow these instructions at home:    Medicines     •Take over-the-counter and prescription medicines only as told by your doctor. Ask your doctor what medicines you should stay away from.       •If you were given an antibiotic medicine, take it as told by your doctor. Do not stop taking it, even if you start to feel better.      General instructions     • Do not smoke or use any products that contain nicotine or tobacco. If you need help quitting, ask your doctor.      •Drink enough fluid to keep your pee pale yellow.      •If you were sent home with a tube that drains the bladder, take care of it as told by your doctor.      •Watch for changes in your symptoms. Tell your doctor about them.      •If told, keep track of changes in your blood pressure at home. Tell your doctor about them.      •Keep all follow-up visits.        Contact a doctor if:    •You have spasms in your bladder that you cannot stop.      •You leak pee when you have spasms.        Get help right away if:    •You have chills or a fever.      •You have blood in your pee.    •You have a tube that drains pee from the bladder and these things happen:  •The tube stops draining pee.      •The tube falls out.          Summary    •Acute urinary retention is when you cannot pee at all or you pee too little.      •If this condition is not treated, it can lead to kidney problems or other serious problems.      •If you were sent home with a tube (catheter) that drains the bladder, take care of it as told by your doctor.      •Watch for changes in your symptoms. Tell your doctor about them.      This information is not intended to replace advice given to you by your health care provider. Make sure you discuss any questions you have with your health care provider.

## 2022-03-03 NOTE — ED PROVIDER NOTE - CARE PLAN
Principal Discharge DX:	Constipation   1 Principal Discharge DX:	Constipation  Secondary Diagnosis:	Urinary retention  Secondary Diagnosis:	Colitis   Principal Discharge DX:	Constipation  Secondary Diagnosis:	Urinary retention  Secondary Diagnosis:	Colitis  Secondary Diagnosis:	Hematuria

## 2022-03-03 NOTE — ED PROVIDER NOTE - NSICDXPASTSURGICALHX_GEN_ALL_CORE_FT
Activities :  -Met with pt and discussed his care   -Rounded with team     -passionate care assisted living- Declined patient.      [facility] called Jessica and requested an update regarding placements and the status of the patient's cadi waiver.     [facility] received a voicemail from jessica stating the patient's waiver was closed yesterday. She stated that there is nothing she can do to help him and won't be able to assist with placements.     -[facility] returned Jessica's voicemail and requested a call back to discuss how the patient's CADI can be reinstated.     [facility] notified patient that his waiver has ended. Writer encouraged him to also reach out to jessica.      Addressed patient needs/concerns: Reviewed possible options for disposition, when he is stabilized.      Discharge Plan or Goal   Plan  New referrals made to assisted living facility. The new assisted living facility is currently reviewing the pt's paperwork.  Commitment    Health Care Follow up Appointment:    Will need psych, therapy, pcp, and  mental michael cm  Medication Management Plan:  See providers notes  Housing:  [facility] #[phone]  Financial Follow ups:  He reports he has GA   SMRTed for SSDI benefits.  Care Team     Jr Giron MD  -[facility] #[phone]    CADI Waiver CM: Jessica Lawson- [facility] #[phone]. [email or web address]  ? Arlin- supervisor for cadi cm [phone]    Joseph Oliveros's supervisor # [phone]  ? Admin #[phone]     PAST SURGICAL HISTORY:  No significant past surgical history

## 2022-03-03 NOTE — H&P ADULT - ASSESSMENT
76M with PMH of DMII, HTN, BPH s/p TURP, no hx of abdominal surgeries accompanied by wife p/w 1 day of constipation with hard pellets, normal flatus, last urination this AM now presenting with abdominal distention. Pt denies fever, chills, cp, sob, palpitations, n/v/d, dysuria. Attempted manual disimpaction in ED unsuccessfully. Senna and prune juice with no relief. Kramer catheter was attempted but failed. Coude catheter was placed and pt had hematuria. Seen by urology. Admitted for monitoring of H/H and clots.      Acute urinary retention  Hematuria  BPH s/p TURP  - Coude placed in ED  - Urology consulted  - monitor H/H  - UA    Hypertension      DMII  - metformin at home  - A1c pending  - ISS for now    GI ppx    DVT ppx  - IPC  - no pharm ppx due to hematuria  - encourage ambulation    discussed with wife at bedside and she is in agreement with treatment plan      76M with PMH of DMII, HTN, BPH s/p TURP, no hx of abdominal surgeries accompanied by wife p/w 1 day of constipation with hard pellets, normal flatus, last urination this AM now presenting with abdominal distention. Pt denies fever, chills, cp, sob, palpitations, n/v/d, dysuria. Attempted manual disimpaction in ED unsuccessfully. Senna and prune juice with no relief. Kramer catheter was attempted but failed. Coude catheter was placed and pt had hematuria. Seen by urology. Admitted for monitoring of H/H and clots.    Acute urinary retention  Hematuria  BPH s/p TURP  - Coude placed in ED  - Urology consulted  - monitor H/H  - UA not sent yet as pt is having beatrice hematuria  - continue tamsulosin 0.4mg nightly    Hypertension  - losartan 50mg daily    DMII  - metformin at home  - A1c pending  - ISS for now    DVT ppx  - IPC  - no pharm ppx due to hematuria  - encourage ambulation    discussed with wife at bedside and she is in agreement with treatment plan

## 2022-03-03 NOTE — CONSULT NOTE ADULT - PROBLEM SELECTOR RECOMMENDATION 9
keep guillen catheter ... irrigate q 1 hour prn with 50ml saline.. may need to advance guillen with lubricant or deflate balloon to facilitate irrigate. Do not remove guillen.    continue flomax and finasteride. treat constipation. delayed void trial

## 2022-03-03 NOTE — ED PROVIDER NOTE - PROGRESS NOTE DETAILS
Will recheck BP after pt is more comfortable as elevated BP thought to be related to his pain unable to insert guillen. multiple attempts. discussed with dr. lambert and will come to the ED when he is done with office hours

## 2022-03-03 NOTE — CHART NOTE - NSCHARTNOTEFT_GEN_A_CORE
Patient presents to the ER in UR, called by Dr. Mendoza to evaluate patient and place guillen catheter.   Patient c/o suprapubic pain, and unable to urinate since this morning. States he came to the ER due to UR and Constipation.   Guillen catheter insertion attempted by ER staff but unsuccessful.   On exam, bladder is distended and pt with SP tenderness. Blood noted at meatus.   18F coude inserted using sterile technique, immediate return of bloody urine, 1000cc urine output. No clots.   Pt tolerated well and states he is feeling better.   Discussed with Dr. Mendoza who will be coming to see the patient. Full consult to follow.     Meryl Ibarra   Surgical PA Patient presents to the ER in UR, called by Dr. Mendoza to evaluate patient and place guillen catheter.   Patient c/o suprapubic pain, and unable to urinate since this morning. States he came to the ER due to UR and Constipation. States he has a history of BPH and takes Flomax QHS, Dr. Mendoza is his Urologist.   Guillen catheter insertion attempted by ER staff but unsuccessful.     On exam, bladder is distended and pt with SP tenderness. Blood noted at meatus.     18F coude inserted using sterile technique, immediate return of bloody urine, 1000cc urine output. No clots.   Pt tolerated well and states he is feeling better.     Discussed with Dr. Mendoza who will be coming to see the patient. Full consult to follow.     Meryl Ibarra   Surgical PA

## 2022-03-03 NOTE — ED ADULT NURSE NOTE - NSICDXPASTMEDICALHX_GEN_ALL_CORE_FT
PAST MEDICAL HISTORY:  BPH (Benign Prostatic Hypertrophy)     Diabetes Mellitus Type II     History of Cataract Surgery     Hyperlipemia

## 2022-03-03 NOTE — ED PROVIDER NOTE - CARE PROVIDERS DIRECT ADDRESSES
[email or web address]

## 2022-03-03 NOTE — ED PROVIDER NOTE - OBJECTIVE STATEMENT
Dr. Hanson: 76M h/o DM on metformin, HTN, no abdo surgeries, accompanied by wife p/w 1 day of constipation with hard pellets, normal flatus, last urination this AM, feels distended. No nausea, vomiting, fevers, chills, dysuria. Tried manual disimpaction, Senna and prune juice with no relief.

## 2022-03-03 NOTE — ED ADULT NURSE REASSESSMENT NOTE - NS ED NURSE REASSESS COMMENT FT1
Bladder scanned pt as per MD orders. Residual 784mL, pt voided 50mL after bladder scan. Pt c/o of "fullness and pressure" in abd region. MD Hanson made aware. Will continue to monitor.

## 2022-03-03 NOTE — ED PROVIDER NOTE - CLINICAL SUMMARY MEDICAL DECISION MAKING FREE TEXT BOX
Pt with no surgical hx p/w constipation, benign abdo exam, hard stool in vault, unable to disimpact, will get bladder scan, fleets, mag citrate, abdo xray to r/o obvious sbo, reassess Pt with no surgical hx p/w constipation, benign abdo exam, hard stool in vault, unable to disimpact, will get bladder scan, fleets, mag citrate, abdo xray to r/o obvious sbo, reassess  Unable to obtain guillen with multiple attempts, unable to get guillen, will consult urology Pt with no surgical hx p/w constipation, benign abdo exam, hard stool in vault, unable to disimpact, will get bladder scan, fleets, mag citrate, abdo xray to r/o obvious sbo, reassess  Unable to obtain guillen with multiple attempts, unable to get guillen, will consult urology  Surgical KASSANDRA Sheth placed guillen and drained 1000ml bloody urine, no CBI recommended, Dr. Mendoza to see pt shortly. Pt with no surgical hx p/w constipation, benign abdo exam, hard stool in vault, unable to disimpact, will get bladder scan, fleets, mag citrate, abdo xray to r/o obvious sbo, reassess  Unable to obtain guillen with multiple attempts, unable to get guillen, will consult urology  Surgical KASSANDRA Sheth placed guillen and drained 1000ml bloody urine, no CBI recommended, Dr. Mendoza to see pt shortly.  Dr. Mendoza saw pt and recommends admission for monitoring given hematuria

## 2022-03-03 NOTE — ED PROVIDER NOTE - PATIENT PORTAL LINK FT
You can access the FollowMyHealth Patient Portal offered by [facility] by registering at the following website: [email or web address]. By joining [facility]’s FollowMyHealth portal, you will also be able to view your health information using other applications (apps) compatible with our system.

## 2022-03-03 NOTE — CONSULT NOTE ADULT - SUBJECTIVE AND OBJECTIVE BOX
Patient is a 76y old  Male who presents with a chief complaint of constipation and urinary retention. history of TURP in past. continues on flomax and proscar. Unable to void. RN unable to place guillen... placed by surgical PA 1000ml bloody urine    HPI: see above      PAST MEDICAL & SURGICAL HISTORY:  Diabetes Mellitus Type II    Hyperlipemia    History of Cataract Surgery    BPH (Benign Prostatic Hypertrophy)    No significant past surgical history        REVIEW OF SYSTEMS:    CONSTITUTIONAL:  no fever or chills  HEENT: No visual changes  ENDO: No sweating  NECK: No pain or stiffness  MUSCULOSKELETAL: No back pain, no joint pain  RESPIRATORY: No shortness of breath  CARDIOVASCULAR: No chest pain  GI YES constipation and pain  NEUROLOGICAL: No mental status changes  PSYCH: No depression, no mood changes  SKIN: No itching      MEDICATIONS  (STANDING):    MEDICATIONS  (PRN):      Allergies    No Known Allergies    Intolerances        SOCIAL HISTORY: No illicit drug use    FAMILY HISTORY:        T(C): 37.7 (03-03-22 @ 18:02), Max: 37.7 (03-03-22 @ 18:02)  T(F): 99.8 (03-03-22 @ 18:02), Max: 99.8 (03-03-22 @ 18:02)  HR: 100 (03-03-22 @ 18:02) (77 - 100)  BP: 128/78 (03-03-22 @ 18:02) (128/78 - 182/106)  RR: 17 (03-03-22 @ 11:49) (17 - 17)  SpO2: 96% (03-03-22 @ 18:02) (96% - 98%)      PHYSICAL EXAM:    Constitutional: alert, no acute distress  Back: No CVA tenderness  Respiratory: No accessory respiratory muscle use  Abd:  distended  no organomegaly  no hernia  : no bladder distention, guillen bloody, testes benign  Extremities: no edema  Neurological: A/O x 3  Psychiatric: Normal mood, normal affect  Skin: No rashes          Weight (kg): 75.7 (03-03-22 @ 11:49)    LABS:                        15.5   7.09  )-----------( 224      ( 03 Mar 2022 12:30 )             47.1                 RADIOLOGY & ADDITIONAL STUDIES:

## 2022-03-03 NOTE — H&P ADULT - HISTORY OF PRESENT ILLNESS
76M with PMH of DMII, HTN, BPH s/p TURP, no hx of abdominal surgeries accompanied by wife p/w 1 day of constipation with hard pellets, normal flatus, last urination this AM now presenting with abdominal distention. Pt denies fever, chills, cp, sob, palpitations, n/v/d, dysuria. Attempted manual disimpaction in ED unsuccessfully. Senna and prune juice with no relief. Kramer catheter was attempted but failed. Coude catheter was placed and pt had hematuria. Seen by urology. Admitted for monitoring of H/H and clots.

## 2022-03-03 NOTE — CONSULT NOTE ADULT - ASSESSMENT
Urinary retention with traumatic guillen catheter    Catheter irrigated by me with 500ml saline, few small clots, sterile technique

## 2022-03-03 NOTE — ED PROVIDER NOTE - ATTENDING CONTRIBUTION TO CARE
Dr. Hanson: I performed a face to face bedside interview with patient regarding history of present illness, review of symptoms and past medical history. I completed an independent physical exam.  I have discussed patient's plan of care with PA.   I agree with note as stated above, having amended the EMR as needed to reflect my findings.   This includes HISTORY OF PRESENT ILLNESS, HIV, PAST MEDICAL/SURGICAL/FAMILY/SOCIAL HISTORY, ALLERGIES AND HOME MEDICATIONS, REVIEW OF SYSTEMS, PHYSICAL EXAM, and any PROGRESS NOTES during the time I functioned as the attending physician for this patient.    Dr. Hanson: This H&P has been written by myself in its entirety

## 2022-03-03 NOTE — PATIENT PROFILE ADULT - NSPROEXTENSIONSOFSELF_GEN_A_NUR
pt did not bring a cane at this admission./cane/eyeglasses pt did not bring a cane at this admission.  pt has a cell phone./cane/eyeglasses

## 2022-03-04 DIAGNOSIS — R33.9 RETENTION OF URINE, UNSPECIFIED: ICD-10-CM

## 2022-03-04 LAB
A1C WITH ESTIMATED AVERAGE GLUCOSE RESULT: 6.4 % — HIGH (ref 4–5.6)
ALBUMIN SERPL ELPH-MCNC: 3.8 G/DL — SIGNIFICANT CHANGE UP (ref 3.3–5)
ALP SERPL-CCNC: 75 U/L — SIGNIFICANT CHANGE UP (ref 40–120)
ALT FLD-CCNC: 31 U/L — SIGNIFICANT CHANGE UP (ref 10–45)
ANION GAP SERPL CALC-SCNC: 10 MMOL/L — SIGNIFICANT CHANGE UP (ref 5–17)
AST SERPL-CCNC: 18 U/L — SIGNIFICANT CHANGE UP (ref 10–40)
BILIRUB SERPL-MCNC: 1 MG/DL — SIGNIFICANT CHANGE UP (ref 0.2–1.2)
BLD GP AB SCN SERPL QL: SIGNIFICANT CHANGE UP
BUN SERPL-MCNC: 13 MG/DL — SIGNIFICANT CHANGE UP (ref 7–23)
CALCIUM SERPL-MCNC: 9.8 MG/DL — SIGNIFICANT CHANGE UP (ref 8.4–10.5)
CHLORIDE SERPL-SCNC: 105 MMOL/L — SIGNIFICANT CHANGE UP (ref 96–108)
CO2 SERPL-SCNC: 25 MMOL/L — SIGNIFICANT CHANGE UP (ref 22–31)
CREAT SERPL-MCNC: 1.14 MG/DL — SIGNIFICANT CHANGE UP (ref 0.5–1.3)
EGFR: 67 ML/MIN/1.73M2 — SIGNIFICANT CHANGE UP
ESTIMATED AVERAGE GLUCOSE: 137 MG/DL — HIGH (ref 68–114)
GLUCOSE BLDC GLUCOMTR-MCNC: 139 MG/DL — HIGH (ref 70–99)
GLUCOSE BLDC GLUCOMTR-MCNC: 184 MG/DL — HIGH (ref 70–99)
GLUCOSE SERPL-MCNC: 194 MG/DL — HIGH (ref 70–99)
HCT VFR BLD CALC: 36.4 % — LOW (ref 39–50)
HCT VFR BLD CALC: 40.4 % — SIGNIFICANT CHANGE UP (ref 39–50)
HCV AB S/CO SERPL IA: 0.07 S/CO — SIGNIFICANT CHANGE UP (ref 0–0.99)
HCV AB SERPL-IMP: SIGNIFICANT CHANGE UP
HGB BLD-MCNC: 12.2 G/DL — LOW (ref 13–17)
HGB BLD-MCNC: 13.4 G/DL — SIGNIFICANT CHANGE UP (ref 13–17)
MCHC RBC-ENTMCNC: 30.5 PG — SIGNIFICANT CHANGE UP (ref 27–34)
MCHC RBC-ENTMCNC: 31.4 PG — SIGNIFICANT CHANGE UP (ref 27–34)
MCHC RBC-ENTMCNC: 33.2 GM/DL — SIGNIFICANT CHANGE UP (ref 32–36)
MCHC RBC-ENTMCNC: 33.5 GM/DL — SIGNIFICANT CHANGE UP (ref 32–36)
MCV RBC AUTO: 92 FL — SIGNIFICANT CHANGE UP (ref 80–100)
MCV RBC AUTO: 93.8 FL — SIGNIFICANT CHANGE UP (ref 80–100)
NRBC # BLD: 0 /100 WBCS — SIGNIFICANT CHANGE UP (ref 0–0)
NRBC # BLD: 0 /100 WBCS — SIGNIFICANT CHANGE UP (ref 0–0)
PLATELET # BLD AUTO: 216 K/UL — SIGNIFICANT CHANGE UP (ref 150–400)
PLATELET # BLD AUTO: 238 K/UL — SIGNIFICANT CHANGE UP (ref 150–400)
POTASSIUM SERPL-MCNC: 4.2 MMOL/L — SIGNIFICANT CHANGE UP (ref 3.5–5.3)
POTASSIUM SERPL-SCNC: 4.2 MMOL/L — SIGNIFICANT CHANGE UP (ref 3.5–5.3)
PROT SERPL-MCNC: 7 G/DL — SIGNIFICANT CHANGE UP (ref 6–8.3)
RBC # BLD: 3.88 M/UL — LOW (ref 4.2–5.8)
RBC # BLD: 4.39 M/UL — SIGNIFICANT CHANGE UP (ref 4.2–5.8)
RBC # FLD: 13.4 % — SIGNIFICANT CHANGE UP (ref 10.3–14.5)
RBC # FLD: 13.7 % — SIGNIFICANT CHANGE UP (ref 10.3–14.5)
SODIUM SERPL-SCNC: 140 MMOL/L — SIGNIFICANT CHANGE UP (ref 135–145)
WBC # BLD: 10.87 K/UL — HIGH (ref 3.8–10.5)
WBC # BLD: 9.87 K/UL — SIGNIFICANT CHANGE UP (ref 3.8–10.5)
WBC # FLD AUTO: 10.87 K/UL — HIGH (ref 3.8–10.5)
WBC # FLD AUTO: 9.87 K/UL — SIGNIFICANT CHANGE UP (ref 3.8–10.5)

## 2022-03-04 PROCEDURE — 99233 SBSQ HOSP IP/OBS HIGH 50: CPT

## 2022-03-04 RX ORDER — SOD SULF/SODIUM/NAHCO3/KCL/PEG
1000 SOLUTION, RECONSTITUTED, ORAL ORAL ONCE
Refills: 0 | Status: COMPLETED | OUTPATIENT
Start: 2022-03-04 | End: 2022-03-04

## 2022-03-04 RX ORDER — ATORVASTATIN CALCIUM 80 MG/1
1 TABLET, FILM COATED ORAL
Qty: 0 | Refills: 0 | DISCHARGE

## 2022-03-04 RX ORDER — LOSARTAN POTASSIUM 100 MG/1
1 TABLET, FILM COATED ORAL
Qty: 0 | Refills: 0 | DISCHARGE

## 2022-03-04 RX ORDER — TAMSULOSIN HYDROCHLORIDE 0.4 MG/1
0.8 CAPSULE ORAL AT BEDTIME
Refills: 0 | Status: DISCONTINUED | OUTPATIENT
Start: 2022-03-04 | End: 2022-03-07

## 2022-03-04 RX ORDER — SODIUM CHLORIDE 9 MG/ML
1000 INJECTION, SOLUTION INTRAVENOUS
Refills: 0 | Status: DISCONTINUED | OUTPATIENT
Start: 2022-03-04 | End: 2022-03-04

## 2022-03-04 RX ORDER — POLYETHYLENE GLYCOL 3350 17 G/17G
17 POWDER, FOR SOLUTION ORAL ONCE
Refills: 0 | Status: COMPLETED | OUTPATIENT
Start: 2022-03-04 | End: 2022-03-04

## 2022-03-04 RX ORDER — METFORMIN HYDROCHLORIDE 850 MG/1
1 TABLET ORAL
Qty: 0 | Refills: 0 | DISCHARGE

## 2022-03-04 RX ORDER — FINASTERIDE 5 MG/1
5 TABLET, FILM COATED ORAL DAILY
Refills: 0 | Status: DISCONTINUED | OUTPATIENT
Start: 2022-03-04 | End: 2022-03-07

## 2022-03-04 RX ORDER — POLYETHYLENE GLYCOL 3350 17 G/17G
17 POWDER, FOR SOLUTION ORAL
Refills: 0 | Status: DISCONTINUED | OUTPATIENT
Start: 2022-03-04 | End: 2022-03-07

## 2022-03-04 RX ADMIN — SENNA PLUS 2 TABLET(S): 8.6 TABLET ORAL at 22:20

## 2022-03-04 RX ADMIN — POLYETHYLENE GLYCOL 3350 17 GRAM(S): 17 POWDER, FOR SOLUTION ORAL at 17:33

## 2022-03-04 RX ADMIN — Medication 5 MILLIGRAM(S): at 17:30

## 2022-03-04 RX ADMIN — Medication 1000 MILLILITER(S): at 12:55

## 2022-03-04 RX ADMIN — FINASTERIDE 5 MILLIGRAM(S): 5 TABLET, FILM COATED ORAL at 11:50

## 2022-03-04 RX ADMIN — LOSARTAN POTASSIUM 50 MILLIGRAM(S): 100 TABLET, FILM COATED ORAL at 07:16

## 2022-03-04 RX ADMIN — ATORVASTATIN CALCIUM 10 MILLIGRAM(S): 80 TABLET, FILM COATED ORAL at 22:20

## 2022-03-04 RX ADMIN — TAMSULOSIN HYDROCHLORIDE 0.8 MILLIGRAM(S): 0.4 CAPSULE ORAL at 22:20

## 2022-03-04 RX ADMIN — SODIUM CHLORIDE 75 MILLILITER(S): 9 INJECTION, SOLUTION INTRAVENOUS at 07:16

## 2022-03-04 RX ADMIN — Medication 5 MILLIGRAM(S): at 11:51

## 2022-03-04 NOTE — PROGRESS NOTE ADULT - SUBJECTIVE AND OBJECTIVE BOX
Patient is a 76y old  Male who presents with a chief complaint of Hematuria (04 Mar 2022 07:45)  RRT called last night for presyncope while walking to bathroom  Pt with active hematuria  Eager to go home   Reports some lower abdominal discomfort--Reports he had BM this AM   Patient seen and examined at bedside.    ALLERGIES:  No Known Allergies    MEDICATIONS  (STANDING):  atorvastatin 10 milliGRAM(s) Oral at bedtime  bisacodyl 5 milliGRAM(s) Oral every 12 hours  finasteride 5 milliGRAM(s) Oral daily  lactated ringers. 1000 milliLiter(s) (75 mL/Hr) IV Continuous <Continuous>  losartan 50 milliGRAM(s) Oral daily  polyethylene glycol 3350 17 Gram(s) Oral two times a day  senna 2 Tablet(s) Oral at bedtime  tamsulosin 0.8 milliGRAM(s) Oral at bedtime    MEDICATIONS  (PRN):    Vital Signs Last 24 Hrs  T(F): 99.3 (04 Mar 2022 05:55), Max: 99.8 (03 Mar 2022 18:02)  HR: 107 (04 Mar 2022 05:55) (77 - 111)  BP: 149/98 (04 Mar 2022 05:55) (120/70 - 182/106)  RR: 17 (04 Mar 2022 05:55) (17 - 18)  SpO2: 99% (04 Mar 2022 05:55) (96% - 99%)  I&O's Summary    03 Mar 2022 07:01  -  04 Mar 2022 07:00  --------------------------------------------------------  IN: 375 mL / OUT: 300 mL / NET: 75 mL        PHYSICAL EXAM:  General: NAD, A/O x 3  ENT: MMM, no thrush  Neck: Supple, No JVD  Lungs: Non labored breathing,  Clear to auscultation bilaterally,   Cardio: RRR, S1/S2, no pitting edema bilaterally  Abdomen: Soft, mild tenderness LLQ, Nondistended; Bowel sounds present  : Kramer, CBI, pink  Extremities: No calf tenderness, moves all extremities    LABS:                        13.4   10.87 )-----------( 238      ( 04 Mar 2022 06:30 )             40.4       03-04    140  |  105  |  13  ----------------------------<  194  4.2   |  25  |  1.14    Ca    9.8      04 Mar 2022 06:30    TPro  7.0  /  Alb  3.8  /  TBili  1.0  /  DBili  x   /  AST  18  /  ALT  31  /  AlkPhos  75  03-04                              POCT Blood Glucose.: 184 mg/dL (04 Mar 2022 06:15)  POCT Blood Glucose.: 170 mg/dL (03 Mar 2022 23:43)  POCT Blood Glucose.: 137 mg/dL (03 Mar 2022 22:16)          COVID-19 PCR: NotDetec (03-03-22 @ 18:26)      RADIOLOGY & ADDITIONAL TESTS:    Care Discussed with Consultants/Other Providers:    Patient is a 76y old  Male who presents with a chief complaint of Hematuria (04 Mar 2022 07:45)    Patient seen and examined at bedside.  RRT called last night for presyncope while walking to bathroom  Pt with active hematuria  Reports some lower abdominal discomfort--Reports he had BM this AM     ALLERGIES:  No Known Allergies    MEDICATIONS  (STANDING):  atorvastatin 10 milliGRAM(s) Oral at bedtime  bisacodyl 5 milliGRAM(s) Oral every 12 hours  finasteride 5 milliGRAM(s) Oral daily  lactated ringers. 1000 milliLiter(s) (75 mL/Hr) IV Continuous <Continuous>  losartan 50 milliGRAM(s) Oral daily  polyethylene glycol 3350 17 Gram(s) Oral two times a day  senna 2 Tablet(s) Oral at bedtime  tamsulosin 0.8 milliGRAM(s) Oral at bedtime    MEDICATIONS  (PRN):    Vital Signs Last 24 Hrs  T(F): 99.3 (04 Mar 2022 05:55), Max: 99.8 (03 Mar 2022 18:02)  HR: 107 (04 Mar 2022 05:55) (77 - 111)  BP: 149/98 (04 Mar 2022 05:55) (120/70 - 182/106)  RR: 17 (04 Mar 2022 05:55) (17 - 18)  SpO2: 99% (04 Mar 2022 05:55) (96% - 99%)  I&O's Summary    03 Mar 2022 07:01  -  04 Mar 2022 07:00  --------------------------------------------------------  IN: 375 mL / OUT: 300 mL / NET: 75 mL        PHYSICAL EXAM:  General: NAD, A/O x 3  ENT: MMM, no thrush  Neck: Supple, No JVD  Lungs: Non labored breathing,  Clear to auscultation bilaterally,   Cardio: RRR, S1/S2, no pitting edema bilaterally  Abdomen: Soft, mild tenderness LLQ, Nondistended; Bowel sounds present  : Kramer, CBI, pink  Extremities: No calf tenderness, moves all extremities    LABS:                        13.4   10.87 )-----------( 238      ( 04 Mar 2022 06:30 )             40.4       03-04    140  |  105  |  13  ----------------------------<  194  4.2   |  25  |  1.14    Ca    9.8      04 Mar 2022 06:30    TPro  7.0  /  Alb  3.8  /  TBili  1.0  /  DBili  x   /  AST  18  /  ALT  31  /  AlkPhos  75  03-04                POCT Blood Glucose.: 184 mg/dL (04 Mar 2022 06:15)  POCT Blood Glucose.: 170 mg/dL (03 Mar 2022 23:43)  POCT Blood Glucose.: 137 mg/dL (03 Mar 2022 22:16)          COVID-19 PCR: NotDetec (03-03-22 @ 18:26)      RADIOLOGY & ADDITIONAL TESTS:  < from: 12 Lead ECG (03.03.22 @ 23:35) >    Ventricular Rate 95 BPM    Atrial Rate 95 BPM    P-R Interval 184 ms    QRS Duration 88 ms    Q-T Interval 354 ms    QTC Calculation(Bazett) 444 ms    P Axis 50 degrees    R Axis 8 degrees    T Axis 31 degrees    Diagnosis Line Normal sinus rhythm  Clockwise rotation  Otherwise normal ECG  When compared with ECG of 03-MAR-2022 18:47,  No significant change was found    Confirmed by VIDA CEJA MD ([id number]) on 3/4/2022 8:18:32 AM    < end of copied text >  personally reviewed NSR 95    Care Discussed with Consultants/Other Providers: Discussed with Uro Dr Mendoza

## 2022-03-04 NOTE — PROGRESS NOTE ADULT - ASSESSMENT
prepped and draped    sterile technique  22Fr 3 way coude guillen    manually  irrigated with 500ml saline    few clots

## 2022-03-04 NOTE — PROGRESS NOTE ADULT - ASSESSMENT
76M with PMH of DMII, HTN, BPH s/p TURP, no hx of abdominal surgeries accompanied by wife p/w 1 day of constipation presenting with abdominal distention due to stool impaction/stercoral colitis.  Also with urinary retention-guillen catheter attempted and now with hematuria and clots.      Acute urinary retention  Hematuria  BPH s/p TURP  - Cont coude catheter, CBI  - Urology following- Dr Mendoza  - monitor H/H with hemautira   - Increase flomax 0.8  - Most likely will go home with guillen when stable     #Constipation:  CT AP with nonspecific colitis , constipation with rectal fecal impaction  -Cont bowel regimen  - GI consult appreciated  - Monitor BM- had small BM this morning     Hypertension  - losartan 50mg daily    DMII  - metformin at home  - A1c pending  - ISS for now    DVT ppx  - IPC  - no pharm ppx due to hematuria  - encourage ambulation    3/4: Called wife Chantelle at [phone] and left voicemail w callback number  Dispo: suspect home in 24-48 hrs w guillen      76M with PMH of DMII, HTN, BPH s/p TURP, no hx of abdominal surgeries accompanied by wife p/w 1 day of constipation presenting with abdominal distention due to stool impaction/stercoral colitis.  Also with urinary retention-guillen catheter attempted and now with hematuria and clots.      Acute urinary retention  Hematuria  BPH s/p TURP  - Cont coude catheter, CBI  - Urology following- Dr Mendoza  - monitor H/H with hemautira   - Increase flomax 0.8  - Most likely will go home with guillen when stable     #Constipation:  CT AP with nonspecific colitis , constipation with rectal fecal impaction  -Cont bowel regimen  - GI consult appreciated-- outpatient follow up 2 weeks   - Monitor BM- had small BM this morning   - Clear liquids today--advance tomorrow    Hypertension  - losartan 50mg daily    DMII  - metformin at home  - A1c pending  - ISS for now    DVT ppx  - IPC  - no pharm ppx due to hematuria  - encourage ambulation    3/4: Called wife Chantelle at [phone] and left voicemail w callback number  Dispo: suspect home in 24-48 hrs w guillen    *Case d/w Dr Vanessa      76M with PMH of DMII, HTN, BPH s/p TURP, no hx of abdominal surgeries accompanied by wife p/w 1 day of constipation presenting with abdominal distention due to stool impaction/stercoral colitis.  Also with urinary retention-guillen catheter attempted and now with hematuria and clots.      Acute urinary retention  Hematuria  BPH s/p TURP  - Cont coude catheter, CBI  - Urology following- Dr Mendoza  - monitor H/H with hemautira   - Increase flomax 0.8  - Most likely will go home with guillen when stable     #Constipation:  CT AP with nonspecific colitis , constipation with rectal fecal impaction  -Cont bowel regimen  - GI consult appreciated-- outpatient follow up 2 weeks   - Monitor BM- had small BM this morning   - Clear liquids today--advance tomorrow    Hypertension  - losartan 50mg daily    Leukocytosis  Downtrending likely reactive to above  Follow up AM CBC    DMII  - metformin at home  - A1c 6.4  - ISS for now    DVT ppx  - IPC  - no pharm ppx due to hematuria  - encourage ambulation    3/4: Called wife Chantelle at [phone] and left voicemail w callback number  Dispo: suspect home in 24-48 hrs w guillen    *Case d/w Dr Vanessa

## 2022-03-04 NOTE — PROGRESS NOTE ADULT - SUBJECTIVE AND OBJECTIVE BOX
Patient is a 76y old  Male who presents with a chief complaint of Hematuria (04 Mar 2022 02:02)    HPI:  76M with PMH of DMII, HTN, BPH s/p TURP, no hx of abdominal surgeries accompanied by wife p/w 1 day of constipation with hard pellets, normal flatus, last urination this AM now presenting with abdominal distention. Pt denies fever, chills, cp, sob, palpitations, n/v/d, dysuria. Attempted manual disimpaction in ED unsuccessfully. Senna and prune juice with no relief. Kramer catheter was attempted but failed. Coude catheter was placed and pt had hematuria. Seen by urology. Admitted for monitoring of H/H and clots. (03 Mar 2022 18:24)    CBI pink, had clots irrigated    Interval Events:  Patient seen and examined at bedside.    MEDICATIONS:  MEDICATIONS  (STANDING):  atorvastatin 10 milliGRAM(s) Oral at bedtime  finasteride 5 milliGRAM(s) Oral daily  lactated ringers. 1000 milliLiter(s) (75 mL/Hr) IV Continuous <Continuous>  losartan 50 milliGRAM(s) Oral daily  polyethylene glycol 3350 17 Gram(s) Oral daily  senna 2 Tablet(s) Oral at bedtime  tamsulosin 0.4 milliGRAM(s) Oral at bedtime    MEDICATIONS  (PRN):      Allergies    No Known Allergies    Intolerances        T(C): 37.4 (03-04-22 @ 05:55), Max: 37.7 (03-03-22 @ 18:02)  T(F): 99.3 (03-04-22 @ 05:55), Max: 99.8 (03-03-22 @ 18:02)  HR: 107 (03-04-22 @ 05:55) (77 - 111)  BP: 149/98 (03-04-22 @ 05:55) (120/70 - 182/106)  RR: 17 (03-04-22 @ 05:55) (17 - 18)  SpO2: 99% (03-04-22 @ 05:55) (96% - 99%)      Weight (kg): 75.7 (03-03-22 @ 11:49)      03-03-22 @ 07:01  -  03-04-22 @ 07:00  --------------------------------------------------------  IN:  Total IN: 0 mL    OUT:    Indwelling Catheter - Urethral (mL): 300 mL  Total OUT: 300 mL    Total NET: -300 mL          LABS:      CBC Full  -  ( 04 Mar 2022 06:30 )  WBC Count : 10.87 K/uL  RBC Count : 4.39 M/uL  Hemoglobin : 13.4 g/dL  Hematocrit : 40.4 %  Platelet Count - Automated : 238 K/uL  Mean Cell Volume : 92.0 fl  Mean Cell Hemoglobin : 30.5 pg  Mean Cell Hemoglobin Concentration : 33.2 gm/dL  Auto Neutrophil # : x  Auto Lymphocyte # : x  Auto Monocyte # : x  Auto Eosinophil # : x  Auto Basophil # : x  Auto Neutrophil % : x  Auto Lymphocyte % : x  Auto Monocyte % : x  Auto Eosinophil % : x  Auto Basophil % : x    03-03    139  |  104  |  13  ----------------------------<  150<H>  5.0   |  29  |  0.97    Ca    10.5      03 Mar 2022 12:30    TPro  8.0  /  Alb  4.3  /  TBili  0.6  /  DBili  x   /  AST  22  /  ALT  40  /  AlkPhos  90  03-03                  Physical Exam    Constitutional: alert, no acute distress    Abdomen: soft, nontender, nondistended, no HSM    Genitourinary: no bladder distention, CBI

## 2022-03-04 NOTE — PROGRESS NOTE ADULT - SUBJECTIVE AND OBJECTIVE BOX
Patient is a 76y old  Male who presents with a chief complaint of Hematuria (03 Mar 2022 18:24)    HPI:  76M with PMH of DMII, HTN, BPH s/p TURP, no hx of abdominal surgeries accompanied by wife p/w 1 day of constipation with hard pellets, normal flatus, last urination this AM now presenting with abdominal distention. Pt denies fever, chills, cp, sob, palpitations, n/v/d, dysuria. Attempted manual disimpaction in ED unsuccessfully. Senna and prune juice with no relief. Kramer catheter was attempted but failed. Coude catheter was placed and pt had hematuria. Seen by urology. Admitted for monitoring of H/H and clots. (03 Mar 2022 18:24)    urgent call for inability to void    Interval Events:  Patient seen and examined at bedside.    MEDICATIONS:  MEDICATIONS  (STANDING):  atorvastatin 10 milliGRAM(s) Oral at bedtime  finasteride 5 milliGRAM(s) Oral daily  losartan 50 milliGRAM(s) Oral daily  polyethylene glycol 3350 17 Gram(s) Oral daily  senna 2 Tablet(s) Oral at bedtime  tamsulosin 0.4 milliGRAM(s) Oral at bedtime    MEDICATIONS  (PRN):      Allergies    No Known Allergies    Intolerances        T(C): 36.8 (03-03-22 @ 21:11), Max: 37.7 (03-03-22 @ 18:02)  T(F): 98.2 (03-03-22 @ 21:11), Max: 99.8 (03-03-22 @ 18:02)  HR: 111 (03-03-22 @ 21:11) (77 - 111)  BP: 133/84 (03-03-22 @ 21:11) (128/78 - 182/106)  RR: 18 (03-03-22 @ 21:11) (17 - 18)  SpO2: 98% (03-03-22 @ 21:11) (96% - 98%)      Weight (kg): 75.7 (03-03-22 @ 11:49)        LABS:      CBC Full  -  ( 03 Mar 2022 23:48 )  WBC Count : 12.00 K/uL  RBC Count : 4.52 M/uL  Hemoglobin : 14.3 g/dL  Hematocrit : 42.9 %  Platelet Count - Automated : 221 K/uL  Mean Cell Volume : 94.9 fl  Mean Cell Hemoglobin : 31.6 pg  Mean Cell Hemoglobin Concentration : 33.3 gm/dL  Auto Neutrophil # : x  Auto Lymphocyte # : x  Auto Monocyte # : x  Auto Eosinophil # : x  Auto Basophil # : x  Auto Neutrophil % : x  Auto Lymphocyte % : x  Auto Monocyte % : x  Auto Eosinophil % : x  Auto Basophil % : x    03-03    139  |  104  |  13  ----------------------------<  150<H>  5.0   |  29  |  0.97    Ca    10.5      03 Mar 2022 12:30    TPro  8.0  /  Alb  4.3  /  TBili  0.6  /  DBili  x   /  AST  22  /  ALT  40  /  AlkPhos  90  03-03                  Physical Exam    Constitutional: alert, yes acute distress    Abdomen: soft, nontender, nondistended, no HSM    Genitourinary: YES bladder distention, blood at meatus

## 2022-03-04 NOTE — CONSULT NOTE ADULT - SUBJECTIVE AND OBJECTIVE BOX
Patient seen and examined.  Full consult dictated and will be available shortly.    Elderly male with multiple medical problems, including constipation admitted with abdominal distention, inability to urinate and severe fecal impaction.  Since admission a Kramer catheter was placed with resulting hematuria and he has had multiple bowel movements.  Comfortable at present time.    VSS.  Abdomen soft, nontender to palpation    CT Abdomen/pelvis results noted    Impression: Severe constipation    Plan:  1. Moviprep now  2. Miralax BID  3. Clear liquid diet today; advance from tomorrow  4. CT findings likely secondary to constipation  5. Follow-up in office in 2 weeks

## 2022-03-05 LAB
ANION GAP SERPL CALC-SCNC: 5 MMOL/L — SIGNIFICANT CHANGE UP (ref 5–17)
BUN SERPL-MCNC: 11 MG/DL — SIGNIFICANT CHANGE UP (ref 7–23)
CALCIUM SERPL-MCNC: 9.3 MG/DL — SIGNIFICANT CHANGE UP (ref 8.4–10.5)
CHLORIDE SERPL-SCNC: 105 MMOL/L — SIGNIFICANT CHANGE UP (ref 96–108)
CO2 SERPL-SCNC: 30 MMOL/L — SIGNIFICANT CHANGE UP (ref 22–31)
CREAT SERPL-MCNC: 1.01 MG/DL — SIGNIFICANT CHANGE UP (ref 0.5–1.3)
EGFR: 77 ML/MIN/1.73M2 — SIGNIFICANT CHANGE UP
GLUCOSE SERPL-MCNC: 164 MG/DL — HIGH (ref 70–99)
HCT VFR BLD CALC: 35.8 % — LOW (ref 39–50)
HCT VFR BLD CALC: 37 % — LOW (ref 39–50)
HGB BLD-MCNC: 11.8 G/DL — LOW (ref 13–17)
HGB BLD-MCNC: 12.4 G/DL — LOW (ref 13–17)
MCHC RBC-ENTMCNC: 31.1 PG — SIGNIFICANT CHANGE UP (ref 27–34)
MCHC RBC-ENTMCNC: 31.9 PG — SIGNIFICANT CHANGE UP (ref 27–34)
MCHC RBC-ENTMCNC: 33 GM/DL — SIGNIFICANT CHANGE UP (ref 32–36)
MCHC RBC-ENTMCNC: 33.5 GM/DL — SIGNIFICANT CHANGE UP (ref 32–36)
MCV RBC AUTO: 94.5 FL — SIGNIFICANT CHANGE UP (ref 80–100)
MCV RBC AUTO: 95.1 FL — SIGNIFICANT CHANGE UP (ref 80–100)
NRBC # BLD: 0 /100 WBCS — SIGNIFICANT CHANGE UP (ref 0–0)
NRBC # BLD: 0 /100 WBCS — SIGNIFICANT CHANGE UP (ref 0–0)
PLATELET # BLD AUTO: 205 K/UL — SIGNIFICANT CHANGE UP (ref 150–400)
PLATELET # BLD AUTO: 207 K/UL — SIGNIFICANT CHANGE UP (ref 150–400)
POTASSIUM SERPL-MCNC: 4.1 MMOL/L — SIGNIFICANT CHANGE UP (ref 3.5–5.3)
POTASSIUM SERPL-SCNC: 4.1 MMOL/L — SIGNIFICANT CHANGE UP (ref 3.5–5.3)
RBC # BLD: 3.79 M/UL — LOW (ref 4.2–5.8)
RBC # BLD: 3.89 M/UL — LOW (ref 4.2–5.8)
RBC # FLD: 13.6 % — SIGNIFICANT CHANGE UP (ref 10.3–14.5)
RBC # FLD: 13.6 % — SIGNIFICANT CHANGE UP (ref 10.3–14.5)
SODIUM SERPL-SCNC: 140 MMOL/L — SIGNIFICANT CHANGE UP (ref 135–145)
WBC # BLD: 8.78 K/UL — SIGNIFICANT CHANGE UP (ref 3.8–10.5)
WBC # BLD: 8.92 K/UL — SIGNIFICANT CHANGE UP (ref 3.8–10.5)
WBC # FLD AUTO: 8.78 K/UL — SIGNIFICANT CHANGE UP (ref 3.8–10.5)
WBC # FLD AUTO: 8.92 K/UL — SIGNIFICANT CHANGE UP (ref 3.8–10.5)

## 2022-03-05 PROCEDURE — 99233 SBSQ HOSP IP/OBS HIGH 50: CPT

## 2022-03-05 RX ORDER — ACETAMINOPHEN 500 MG
650 TABLET ORAL EVERY 6 HOURS
Refills: 0 | Status: DISCONTINUED | OUTPATIENT
Start: 2022-03-05 | End: 2022-03-07

## 2022-03-05 RX ADMIN — TAMSULOSIN HYDROCHLORIDE 0.8 MILLIGRAM(S): 0.4 CAPSULE ORAL at 22:03

## 2022-03-05 RX ADMIN — SENNA PLUS 2 TABLET(S): 8.6 TABLET ORAL at 22:03

## 2022-03-05 RX ADMIN — POLYETHYLENE GLYCOL 3350 17 GRAM(S): 17 POWDER, FOR SOLUTION ORAL at 17:12

## 2022-03-05 RX ADMIN — POLYETHYLENE GLYCOL 3350 17 GRAM(S): 17 POWDER, FOR SOLUTION ORAL at 06:55

## 2022-03-05 RX ADMIN — ATORVASTATIN CALCIUM 10 MILLIGRAM(S): 80 TABLET, FILM COATED ORAL at 22:03

## 2022-03-05 RX ADMIN — Medication 650 MILLIGRAM(S): at 01:12

## 2022-03-05 RX ADMIN — LOSARTAN POTASSIUM 50 MILLIGRAM(S): 100 TABLET, FILM COATED ORAL at 06:55

## 2022-03-05 RX ADMIN — FINASTERIDE 5 MILLIGRAM(S): 5 TABLET, FILM COATED ORAL at 11:52

## 2022-03-05 RX ADMIN — Medication 5 MILLIGRAM(S): at 06:55

## 2022-03-05 RX ADMIN — Medication 650 MILLIGRAM(S): at 02:12

## 2022-03-05 NOTE — PROVIDER CONTACT NOTE (OTHER) - SITUATION
pt has a guillen catheter, gross hematuria noted. c/o pressure pain on the low abdomen.  try to irrigation via guillen  but not functioning.
pt place on CBI, pink color and some blood clots mixed urine gravity drain.  c/o severe pressure pain on the low abdomen area.

## 2022-03-05 NOTE — PROGRESS NOTE ADULT - SUBJECTIVE AND OBJECTIVE BOX
Patient is a 76y old  Male who presents with a chief complaint of Hematuria (05 Mar 2022 07:49)    HPI:  76M with PMH of DMII, HTN, BPH s/p TURP, no hx of abdominal surgeries accompanied by wife p/w 1 day of constipation with hard pellets, normal flatus, last urination this AM now presenting with abdominal distention. Pt denies fever, chills, cp, sob, palpitations, n/v/d, dysuria. Attempted manual disimpaction in ED unsuccessfully. Senna and prune juice with no relief. Kramer catheter was attempted but failed. Coude catheter was placed and pt had hematuria. Seen by urology. Admitted for monitoring of H/H and clots. (03 Mar 2022 18:24)    CBI running with light pink urine    Interval Events:  Patient seen and examined at bedside.    MEDICATIONS:  MEDICATIONS  (STANDING):  atorvastatin 10 milliGRAM(s) Oral at bedtime  finasteride 5 milliGRAM(s) Oral daily  losartan 50 milliGRAM(s) Oral daily  polyethylene glycol 3350 17 Gram(s) Oral two times a day  senna 2 Tablet(s) Oral at bedtime  tamsulosin 0.8 milliGRAM(s) Oral at bedtime    MEDICATIONS  (PRN):  acetaminophen     Tablet .. 650 milliGRAM(s) Oral every 6 hours PRN Temp greater or equal to 38C (100.4F), Mild Pain (1 - 3)      Allergies    No Known Allergies    Intolerances        T(C): 36.9 (03-05-22 @ 05:16), Max: 37.7 (03-03-22 @ 18:02)  T(F): 98.4 (03-05-22 @ 05:16), Max: 99.8 (03-03-22 @ 18:02)  HR: 90 (03-05-22 @ 05:16) (81 - 111)  BP: 108/65 (03-05-22 @ 05:16) (108/65 - 159/68)  RR: 18 (03-05-22 @ 05:16) (16 - 18)  SpO2: 99% (03-05-22 @ 05:16) (96% - 99%)          03-03-22 @ 07:01  -  03-04-22 @ 07:00  --------------------------------------------------------  IN:  Total IN: 0 mL    OUT:    Indwelling Catheter - Urethral (mL): 300 mL  Total OUT: 300 mL    Total NET: -300 mL      03-04-22 @ 07:01  -  03-05-22 @ 07:00  --------------------------------------------------------  IN:  Total IN: 0 mL    OUT:    Indwelling Catheter - Urethral (mL): 80896 mL  Total OUT: 76627 mL    Total NET: -77037 mL          LABS:      CBC Full  -  ( 05 Mar 2022 07:22 )  WBC Count : 8.78 K/uL  RBC Count : 3.79 M/uL  Hemoglobin : 11.8 g/dL  Hematocrit : 35.8 %  Platelet Count - Automated : 207 K/uL  Mean Cell Volume : 94.5 fl  Mean Cell Hemoglobin : 31.1 pg  Mean Cell Hemoglobin Concentration : 33.0 gm/dL  Auto Neutrophil # : x  Auto Lymphocyte # : x  Auto Monocyte # : x  Auto Eosinophil # : x  Auto Basophil # : x  Auto Neutrophil % : x  Auto Lymphocyte % : x  Auto Monocyte % : x  Auto Eosinophil % : x  Auto Basophil % : x    03-05    140  |  105  |  11  ----------------------------<  164<H>  4.1   |  30  |  1.01    Ca    9.3      05 Mar 2022 07:22    TPro  7.0  /  Alb  3.8  /  TBili  1.0  /  DBili  x   /  AST  18  /  ALT  31  /  AlkPhos  75  03-04                  Physical Exam    Constitutional: alert, no acute distress    Abdomen: soft, nontender, nondistended, no HSM    Genitourinary: no bladder distention, CBI

## 2022-03-05 NOTE — PROGRESS NOTE ADULT - PROBLEM SELECTOR PLAN 2
keep guillen, void trial in 1 week as outpatient
guillen drainage, flomax, proscar
guillen, increase flomax to 0.8mg    ? discharge with guillen when stable    total 60 minutes spent with patient evaluation and treatment

## 2022-03-05 NOTE — PROGRESS NOTE ADULT - ASSESSMENT
76M with PMH of DMII, HTN, BPH s/p TURP, no hx of abdominal surgeries accompanied by wife p/w 1 day of constipation presenting with abdominal distention due to stool impaction/stercoral colitis.  Also with urinary retention-guillen catheter attempted and now with hematuria and clots.      #Acute urinary retention  #Hematuria  #BPH s/p TURP  - Cont coude catheter, CBI  - Monitor H/H with hematuria - HGB downtrending 12.4-11.8   - Continue flomax 0.8  - Most likely will go home with guillen when stable   - Urology following- Dr Mendoza    #Constipation:  - CT AP with nonspecific colitis, constipation with rectal fecal impaction  - Cont bowel regimen  - Monitor BM- had small BM yesterday  - Continue low fiber diet  - GI consult appreciated-- outpatient follow up 2 weeks     #Hypertension  - losartan 50mg daily    #Leukocytosis - resolved     #DMII  - On metformin at home  - A1c 6.4%  - ISS for now    #DVT ppx  - IPC  - No pharm ppx due to hematuria  - Encourage ambulation    3/4: Called wife Chantelle at [phone] and left voicemail w callback number    Dispo: suspect home in 24-48 hrs w wilmer          76M with PMH of DMII, HTN, BPH s/p TURP, no hx of abdominal surgeries accompanied by wife p/w 1 day of constipation presenting with abdominal distention due to stool impaction/stercoral colitis.  Also with urinary retention-guillen catheter attempted and now with hematuria and clots.      #Acute urinary retention  #Hematuria  #BPH s/p TURP  - Cont coude catheter, CBI  - Monitor H/H with hematuria - HGB downtrending 12.4->11.8   - Continue flomax 0.8 and finasteride  - Most likely will go home with guillen when stable   - Urology following- Dr Mendoza    #Constipation:  - CT AP with nonspecific colitis, constipation with rectal fecal impaction  - Cont bowel regimen  - Monitor BM- had small BM yesterday  - Continue low fiber diet  - GI consult appreciated-- outpatient follow up 2 weeks     #Hypertension  - losartan 50mg daily    #Leukocytosis - resolved     #DMII  - On metformin at home  - A1c 6.4%  - ISS for now    #DVT ppx  - IPC  - No pharm ppx due to hematuria  - Encourage ambulation    3/4: Called wife Chantelle at [phone] and left voicemail w callback number    Dispo: suspect home in 24-48 hrs w wilmer          76M with PMH of DMII, HTN, BPH s/p TURP, no hx of abdominal surgeries accompanied by wife p/w 1 day of constipation presenting with abdominal distention due to stool impaction/stercoral colitis.  Also with urinary retention-guillen catheter attempted and now with hematuria and clots.      #Acute urinary retention  #Hematuria  #BPH s/p TURP  - Cont coude catheter, guillen drainage light red, CBI paused will reassess   - Monitor H/H with hematuria - HGB downtrending 12.4->11.8   - Continue flomax 0.8 and finasteride  - Most likely will go home with guillen when stable   - Urology following- Dr Mendoza    #Constipation:  - CT AP with nonspecific colitis, constipation with rectal fecal impaction  - Cont bowel regimen  - Monitor BM - having BMs   - Continue low fiber diet  - GI consult appreciated-- outpatient follow up 2 weeks     #Hypertension  - losartan 50mg daily    #Leukocytosis - resolved     #DMII  - On metformin at home  - A1c 6.4%  - ISS for now    #DVT ppx  - IPC  - No pharm ppx due to hematuria  - Encourage ambulation    3/5: Updated wife Chantelle at [phone]     Dispo: Suspect home in 24-48 hrs w guillen          76M with PMH of DMII, HTN, BPH s/p TURP, no hx of abdominal surgeries accompanied by wife p/w 1 day of constipation presenting with abdominal distention admitted for sepsis due to stool impaction/stercoral colitis.  Also with urinary retention-guillen catheter attempted and now with hematuria and clots.      #Acute urinary retention  #Hematuria  #BPH s/p TURP  #Acute blood loss anemia  - Cont coude catheter, guillen drainage light red, CBI paused will reassess   - Monitor H/H with hematuria - HGB downtrending 12.4->11.8   - Continue flomax 0.8 and finasteride  - Most likely will go home with guillen when stable   - Urology following- Dr Mendoza    #Sepsis due to colitis  #Constipation:  - Sepsis present on admission - leukocytosis and tachycardia - likely due to colitis. Sepsis is resolved. Leukocytosis resolved.   - CT AP with nonspecific colitis, constipation with rectal fecal impaction  - Cont bowel regimen  - Monitor BM - having multiple BMs   - Continue low fiber diet  - GI consult appreciated-- outpatient follow up 2 weeks     #Hypertension  - losartan 50mg daily    #DMII  - On metformin at home  - A1c 6.4%  - ISS for now    #DVT ppx  - IPC  - No pharm ppx due to hematuria  - Encourage ambulation    3/5: Updated wife Chantelle at [phone]     Dispo: Suspect home in 24-48 hrs w wilmer

## 2022-03-05 NOTE — CHART NOTE - NSCHARTNOTEFT_GEN_A_CORE
CC: Bladder Pain and pressure from CBI   Patient is a 76y old  Male who presents with a chief complaint of Hematuria (04 Mar 2022 11:14)      INTERVAL HPI/OVERNIGHT EVENTS:  Pt seen and examined in the room for c/o bladder pressure and pain due to CBI  CBI evaluated patent  Mannual irregation done at bedside with clear pink urine no clots noted  Bladder scan done with 200ml residual no large volume    MEDICATIONS  (STANDING):  atorvastatin 10 milliGRAM(s) Oral at bedtime  bisacodyl 5 milliGRAM(s) Oral every 12 hours  finasteride 5 milliGRAM(s) Oral daily  losartan 50 milliGRAM(s) Oral daily  polyethylene glycol 3350 17 Gram(s) Oral two times a day  senna 2 Tablet(s) Oral at bedtime  tamsulosin 0.8 milliGRAM(s) Oral at bedtime    MEDICATIONS  (PRN):  acetaminophen     Tablet .. 650 milliGRAM(s) Oral every 6 hours PRN Temp greater or equal to 38C (100.4F), Mild Pain (1 - 3)      Allergies    No Known Allergies    Intolerances        REVIEW OF SYSTEMS:  CONSTITUTIONAL: No fever, weight loss, or fatigue  EYES: No eye pain, visual disturbances, or discharge  ENMT:  No difficulty hearing, tinnitus, vertigo; No sinus or throat pain  NECK: No pain or stiffness  BREASTS: No pain, masses, or nipple discharge  RESPIRATORY: No cough, wheezing, chills or hemoptysis; No shortness of breath  CARDIOVASCULAR: No chest pain, palpitations, lightheadedness, or leg swelling  GASTROINTESTINAL: No abdominal or epigastric pain. No nausea, vomiting, or hematemesis; No diarrhea or constipation. No melena or hematochezia.  GENITOURINARY: No dysuria, frequency, hematuria, or incontinence  NEUROLOGICAL: No headaches, memory loss, vertigo, loss of strength, numbness, or tremors  SKIN: No itching, burning, rashes, or lesions   LYMPH NODES: No enlarged glands  ENDOCRINE: No heat or cold intolerance; No hair loss; No polydipsia or polyuria  MUSCULOSKELETAL: No joint pain or swelling; No muscle, back, or extremity pain  PSYCHIATRIC: No depression, anxiety, or mood swings  HEME/LYMPH: No easy bruising, or bleeding gums  ALLERGY AND IMMUNOLOGIC: No hives or eczema    Vital Signs Last 24 Hrs  T(C): 36.9 (05 Mar 2022 05:16), Max: 37.2 (04 Mar 2022 15:20)  T(F): 98.4 (05 Mar 2022 05:16), Max: 98.9 (04 Mar 2022 15:20)  HR: 90 (05 Mar 2022 05:16) (81 - 96)  BP: 108/65 (05 Mar 2022 05:16) (108/65 - 157/86)  BP(mean): --  RR: 18 (05 Mar 2022 05:16) (16 - 18)  SpO2: 99% (05 Mar 2022 05:16) (98% - 99%)    PHYSICAL EXAM:  GENERAL: NAD, well-groomed, well-developed  HEAD:  Atraumatic, Normocephalic  EYES: EOMI, PERRLA, conjunctiva and sclera clear  ENMT: Moist mucous membranes, Good dentition, No lesions; No tonsillar erythema, exudates, or enlargement  NECK: Supple, No JVD, Normal thyroid  NERVOUS SYSTEM:  Alert & Oriented X3, Good concentration; All 4 extremities mobile, no gross sensory deficits.   CHEST/LUNG: Clear to auscultation bilaterally; No rales, rhonchi, wheezing, or rubs  HEART: Regular rate and rhythm; No murmurs, rubs, or gallops  ABDOMEN: Soft, tender over bladder, Nondistended; Bowel sounds present  EXTREMITIES:  2+ Peripheral Pulses, No clubbing, cyanosis, or edema  LYMPH: No lymphadenopathy noted  SKIN: No rashes or lesions    LABS:                        12.4   8.92  )-----------( 205      ( 05 Mar 2022 01:30 )             37.0     04 Mar 2022 06:30    140    |  105    |  13     ----------------------------<  194    4.2     |  25     |  1.14     Ca    9.8        04 Mar 2022 06:30    TPro  7.0    /  Alb  3.8    /  TBili  1.0    /  DBili  x      /  AST  18     /  ALT  31     /  AlkPhos  75     04 Mar 2022 06:30        CAPILLARY BLOOD GLUCOSE      POCT Blood Glucose.: 139 mg/dL (04 Mar 2022 23:07)  POCT Blood Glucose.: 184 mg/dL (04 Mar 2022 06:15)        Assessment and Plan:   Assessment	  76M with PMH of DMII, HTN, BPH s/p TURP, no hx of abdominal surgeries accompanied by wife p/w 1 day of constipation presenting with abdominal distention due to stool impaction/stercoral colitis.  Also with urinary retention catheter attempted and now with hematuria and clots.        []Bladder pain:  - Irrigate bladder till clear urine  - Tylenol 650mg po PRN for pain    []Acute urinary retention  Hematuria resolving  BPH s/p TURP  - Repeat stat CBC stable  - Cont coude catheter, CBI monitor I&O  - Urology following- Dr Mendoza  - monitor H/H with hematuria   - Continue flomax 0.8

## 2022-03-05 NOTE — PROVIDER CONTACT NOTE (OTHER) - ASSESSMENT
provided pt kaylynn bladder irrigation to remove blood clots and maintained patent catheter.
pt is alert, o x 4. restless. c/o pressure pain on the abdomen. unable to irrigated guillen catheter.

## 2022-03-05 NOTE — PROGRESS NOTE ADULT - SUBJECTIVE AND OBJECTIVE BOX
HISTORY OF PRESENT ILLNESS  Marilyne Cabot is a 62 y.o. female    HPI: Ms. Bailey Duty  returns today for f/u of chronic neck pain due to cervical spondylosis and stenosis secondary to an industrial accident sustained several years ago. No h/o neck surgery. Good improvement with C7-T1 MARILOU injections by Dr. Jill Wong last one on 7/18/2017. Ms. Bailey Duty continues unchanged since last visit. We discussed her current condition and medications in detail. During last visit we discussed the departure of her normal provider, Dr. Maria Fernanda Marroquin, who recently left our practice. We discussed tapering her medication slightly. I recommend that we adjust her 25 mcg fentanyl patch down to 12 mcg patch to equal 112 mcg. She is in agreement with this plan. She will continue with her oxycodone to use on an as needed basis only. She is interested in continuing to see Dr. Maria Fernanda Marroquin and is currently trying to transfer her care to see Dr. Maria Fernanda Marroquin. she will let us know when and if she transitions her care. We also discussed her injections with Dr. Jill Wong last visit. She received her first injection but was scheduled for a series of 3. She has not been scheduled for her second or third injection. I have asked her to discuss this further with scheduling. I will have her follow-up in 3 months or sooner if needed. Medications are helping with pain control and quality of life. Her pain is 4-5/10 with medication and 6-7/10 without. Pt describes pain as throbbing, spasms, and aching. Aggravating factors include cold weather and certain movments. Relieved with rest, medication, and avoiding painful activities. Current treatment is helping to improve general activity, mood, walking, sleep, enjoyment of life    In the past 30 days, the patient reports approximately 20-30% pain relief with current treatment/medications.     Pt does report constipation but is well controlled with Movantik  She  is otherwise doing well with no other complaints today. She denies any adverse events including nausea, vomiting, dizziness, increased constipation, hallucinations, or seizures. Because the patient's current regimen places him/her at increased risk for possible overdose, a prescription for naloxone nasal spray has been provided. The patient understands that this medication is only to be used in the setting of a possible overdose and that inadvertent use of this medication could precipitate overt withdrawal.         Allergies   Allergen Reactions    Oxycontin [Oxycodone] Not Reported This Time     Pt states not allergic to oxycontin         Past Surgical History:   Procedure Laterality Date    HX BREAST BIOPSY      Negative    HX CHOLECYSTECTOMY      HX HYSTERECTOMY      HX LITHOTRIPSY      HX OOPHORECTOMY      HX THYROIDECTOMY      Partial thyroidectomy         Review of Systems   Constitutional: Negative for chills, fever and weight loss. HENT: Negative for congestion and sore throat. Eyes: Negative for blurred vision and double vision. Respiratory: Negative for cough, shortness of breath and wheezing. Cardiovascular: Negative for chest pain and palpitations. Gastrointestinal: Negative for abdominal pain, constipation, diarrhea, heartburn, nausea and vomiting. Genitourinary: Negative. Neurological: Negative for dizziness, seizures, loss of consciousness and headaches. Endo/Heme/Allergies: Does not bruise/bleed easily. Psychiatric/Behavioral: Negative for depression. The patient is not nervous/anxious. Physical Exam   Constitutional: She is oriented to person, place, and time and well-developed, well-nourished, and in no distress. No distress. overweight   HENT:   Head: Normocephalic and atraumatic. Eyes: EOM are normal.   Pulmonary/Chest: Effort normal.   Neurological: She is alert and oriented to person, place, and time. Skin: Skin is warm and dry. No rash noted. She is not diaphoretic. No erythema.    Psychiatric: Mood, memory, affect and judgment normal.   Nursing note and vitals reviewed. ASSESSMENT:    1. Cervicalgia    2. Cervical radiculopathy    3. Spinal stenosis in cervical region    4. Insomnia, unspecified type           [place] Prescription Monitoring Program was reviewed which does not demonstrate aberrancies and/or inconsistencies with regard to the historical prescribing of controlled medications to this patient by other providers. PLAN / Pt Instructions:  1. Continue current plan with no evidence of addiction or diversion. Stable on current medication without adverse events. 2. Refill and adjust fentanyl 100 mcg patch. Adjust  25 mcg patch down to 12 mcg to equal 112 mcg every 48 hours. Plan to continue taper next visit. 3. Refill oxycodone 10/325 mg up to 4 times daily as needed. 4. Schedule repeat injection with Dr. Miranda Jacobsen. This will be discussed with his . 5. Naloxone 4 mg nasal spray for opioid induced respiratory depression emergency only. 6. Discussed risks of addiction, dependency, and opioid induced hyperalgesia. 7. Return to clinic in 3 months. Please call and cancel this appointment along with your pain management agreement if you do decide to transition her care by this time. Medications Ordered Today   Medications    fentaNYL (DURAGESIC) 100 mcg/hr PATCH     Si Patch by TransDERmal route every fourty-eight (48) hours for 30 days. Max Daily Amount: 1 Patch. Apply along with 12 mcg patch to equal 112 mcg for chronic, severe, refractory pain. [facility] brands only  Indications: Chronic Pain with Opioid Tolerance, SEVERE PAIN WITH OPIOID TOLERANCE     Dispense:  15 Patch     Refill:  0    fentaNYL (DURAGESIC) 100 mcg/hr PATCH     Si Patch by TransDERmal route every fourty-eight (48) hours for 30 days. Max Daily Amount: 1 Patch. Apply along with 12 mcg patch to equal 112 mcg for chronic, severe, refractory pain.  [facility] brands only  Indications: Chronic Pain with Opioid Tolerance, SEVERE PAIN WITH OPIOID TOLERANCE     Dispense:  15 Patch     Refill:  0    fentaNYL (DURAGESIC) 100 mcg/hr PATCH     Si Patch by TransDERmal route every fourty-eight (48) hours for 30 days. Max Daily Amount: 1 Patch. Apply along with 12 mcg patch to equal 112 mcg for chronic, severe, refractory pain. [facility] brands only  Indications: Chronic Pain with Opioid Tolerance, SEVERE PAIN WITH OPIOID TOLERANCE     Dispense:  15 Patch     Refill:  0    oxyCODONE-acetaminophen (PERCOCET)  mg per tablet     Sig: Take 1 Tab by mouth four (4) times daily as needed for Pain for up to 30 days. Max Daily Amount: 4 Tabs. Indications: Pain     Dispense:  120 Tab     Refill:  0    oxyCODONE-acetaminophen (PERCOCET)  mg per tablet     Sig: Take 1 Tab by mouth four (4) times daily as needed for Pain for up to 30 days. Max Daily Amount: 4 Tabs. Indications: Pain     Dispense:  120 Tab     Refill:  0    oxyCODONE-acetaminophen (PERCOCET)  mg per tablet     Sig: Take 1 Tab by mouth four (4) times daily as needed for Pain for up to 30 days. Max Daily Amount: 4 Tabs. Indications: Pain     Dispense:  120 Tab     Refill:  0    fentaNYL (DURAGESIC) 12 mcg/hr patch     Si Patch by TransDERmal route every seventy-two (72) hours for 30 days. Max Daily Amount: 1 Patch. Apply along with 100 mcg patch for chronic, severe, refractory pain. writewith brands only     Dispense:  15 Patch     Refill:  0    fentaNYL (DURAGESIC) 12 mcg/hr patch     Si Patch by TransDERmal route every fourty-eight (48) hours for 30 days. Max Daily Amount: 1 Patch. Apply along with 100 mcg patch for chronic, severe, refractory pain. writewith brands only     Dispense:  15 Patch     Refill:  0    fentaNYL (DURAGESIC) 12 mcg/hr patch     Si Patch by TransDERmal route every fourty-eight (48) hours for 30 days. Max Daily Amount: 1 Patch. Apply along with 100 mcg patch for chronic, severe, refractory pain.  writewith brands Patient is a 76y old  Male who presents with a chief complaint of Hematuria (05 Mar 2022 07:49)    Patient seen and examined at bedside. Patient currently denying pain/pressure in abd/pelvic region. Kramer drainage light red s/p CBI, irrigation paused. No overnight events reported.     ALLERGIES:  No Known Allergies    MEDICATIONS  (STANDING):  atorvastatin 10 milliGRAM(s) Oral at bedtime  bisacodyl 5 milliGRAM(s) Oral every 12 hours  finasteride 5 milliGRAM(s) Oral daily  losartan 50 milliGRAM(s) Oral daily  polyethylene glycol 3350 17 Gram(s) Oral two times a day  senna 2 Tablet(s) Oral at bedtime  tamsulosin 0.8 milliGRAM(s) Oral at bedtime    MEDICATIONS  (PRN):  acetaminophen     Tablet .. 650 milliGRAM(s) Oral every 6 hours PRN Temp greater or equal to 38C (100.4F), Mild Pain (1 - 3)    Vital Signs Last 24 Hrs  T(F): 98.4 (05 Mar 2022 05:16), Max: 98.9 (04 Mar 2022 15:20)  HR: 90 (05 Mar 2022 05:16) (81 - 96)  BP: 108/65 (05 Mar 2022 05:16) (108/65 - 157/86)  RR: 18 (05 Mar 2022 05:16) (16 - 18)  SpO2: 99% (05 Mar 2022 05:16) (98% - 99%)    I&O's Summary  04 Mar 2022 07:01  -  05 Mar 2022 07:00  --------------------------------------------------------  IN: 53964 mL / OUT: 71501 mL / NET: 94758 mL    05 Mar 2022 07:01  -  05 Mar 2022 10:16  --------------------------------------------------------  IN: 0 mL / OUT: 7000 mL / NET: -7000 mL    PHYSICAL EXAM:  General: NAD, A/O x 3  ENT: No gross hearing impairment, Moist mucous membranes, no thrush  Neck: Supple, No JVD  Lungs: Clear to auscultation bilaterally, good air entry, non-labored breathing  Cardio: RRR, S1/S2, No murmur  Abdomen: Soft, Nontender, Nondistended; Bowel sounds present  : Kramer in place, clear red urine with CBI running, CBI paused   Extremities: No calf tenderness, No cyanosis, No pitting edema  Psych: Appropriate mood and affect    LABS:                        11.8   8.78  )-----------( 207      ( 05 Mar 2022 07:22 )             35.8     03-05    140  |  105  |  11  ----------------------------<  164  4.1   |  30  |  1.01    Ca    9.3      05 Mar 2022 07:22    TPro  7.0  /  Alb  3.8  /  TBili  1.0  /  DBili  x   /  AST  18  /  ALT  31  /  AlkPhos  75  03-04    POCT Blood Glucose.: 139 mg/dL (04 Mar 2022 23:07)    COVID-19 PCR: NotDetec (03-03-22 @ 18:26)    RADIOLOGY & ADDITIONAL TESTS:  < from: CT Abdomen and Pelvis w/ Oral Cont and w/ IV Cont (03.03.22 @ 14:14) >  IMPRESSION:  Constipated colon with rectal fecal impaction. Thick-walled sigmoid colon   consistent with nonspecific colitis..    Heterogeneous enlarged prostate with intravesicular extension. Distended   bladder resulting in mild bilateral hydroureteronephrosis.    < end of copied text >    Care Discussed with Consultants/Other Providers:    only     Dispense:  15 Patch     Refill:  0    eszopiclone (LUNESTA) 2 mg tablet     Sig: Take 1 Tab by mouth nightly for 30 days. Max Daily Amount: 2 mg. Indications: Insomnia     Dispense:  30 Tab     Refill:  2         DISPOSITION     Pain medications are prescribed with the objective of pain relief and improved physical and psychosocial function in this patient.  Pain Meds and Quality Of Life have been reviewed. Nonpharmacologic therapy and non-opioid pharmacologic therapy have been considered. Opioid therapy is only prescribed if the expected benefits are anticipated to outweigh risks.  Counseled patient on proper use of prescribed medications.  Counseled patient about chronic medical conditions and their relationship to anxiety and depression and recommended mental health support as needed.  Reviewed with patient self-help tools, home exercise, and lifestyle changes to assist the patient in self-management of symptoms.  Reviewed with patient the treatment plan, goals of treatment plan, and limitations of treatment plan, to include the potential for side effects from medications and procedures. If side effects occur, it is the responsibility of the patient to inform the clinic so that a change in the treatment plan can be made in a safe manner. The patient is advised that stopping prescribed medication may cause an increase in symptoms and possible medication withdrawal symptoms. The patient is informed an emergency room evaluation may be necessary if this occurs. Spent 25 minutes with patient today which more than 50% of that time was spent on counseling and coordination of care. Catalina Garcia 2/19/2018        Note: Please excuse any typographical errors. Voice recognition software was used for this note and may cause mistakes. Patient is a 76y old  Male who presents with a chief complaint of Hematuria (05 Mar 2022 07:49)    Patient seen and examined at bedside. Overnight reports having pressure and frequent irrigation with clots and clear liquid. Patient currently denying pain/pressure in abd/pelvic region. Kramer drainage light red s/p CBI, irrigation paused.  Having multiple BMs.  ALLERGIES:  No Known Allergies    MEDICATIONS  (STANDING):  atorvastatin 10 milliGRAM(s) Oral at bedtime  bisacodyl 5 milliGRAM(s) Oral every 12 hours  finasteride 5 milliGRAM(s) Oral daily  losartan 50 milliGRAM(s) Oral daily  polyethylene glycol 3350 17 Gram(s) Oral two times a day  senna 2 Tablet(s) Oral at bedtime  tamsulosin 0.8 milliGRAM(s) Oral at bedtime    MEDICATIONS  (PRN):  acetaminophen     Tablet .. 650 milliGRAM(s) Oral every 6 hours PRN Temp greater or equal to 38C (100.4F), Mild Pain (1 - 3)    Vital Signs Last 24 Hrs  T(F): 98.4 (05 Mar 2022 05:16), Max: 98.9 (04 Mar 2022 15:20)  HR: 90 (05 Mar 2022 05:16) (81 - 96)  BP: 108/65 (05 Mar 2022 05:16) (108/65 - 157/86)  RR: 18 (05 Mar 2022 05:16) (16 - 18)  SpO2: 99% (05 Mar 2022 05:16) (98% - 99%)    I&O's Summary  04 Mar 2022 07:01  -  05 Mar 2022 07:00  --------------------------------------------------------  IN: 06780 mL / OUT: 63813 mL / NET: 68989 mL    05 Mar 2022 07:01  -  05 Mar 2022 10:16  --------------------------------------------------------  IN: 0 mL / OUT: 7000 mL / NET: -7000 mL    PHYSICAL EXAM:  General: NAD, A/O x 3  ENT: No gross hearing impairment, Moist mucous membranes, no thrush  Neck: Supple, No JVD  Lungs: Clear to auscultation bilaterally, good air entry, non-labored breathing  Cardio: RRR, S1/S2, No murmur  Abdomen: Soft, Nontender, Nondistended; Bowel sounds present  : Kramer in place, clear red urine with CBI running, CBI paused   Extremities: No calf tenderness, No cyanosis, No pitting edema  Psych: Appropriate mood and affect    LABS:                        11.8   8.78  )-----------( 207      ( 05 Mar 2022 07:22 )             35.8     03-05    140  |  105  |  11  ----------------------------<  164  4.1   |  30  |  1.01    Ca    9.3      05 Mar 2022 07:22    TPro  7.0  /  Alb  3.8  /  TBili  1.0  /  DBili  x   /  AST  18  /  ALT  31  /  AlkPhos  75  03-04    POCT Blood Glucose.: 139 mg/dL (04 Mar 2022 23:07)    COVID-19 PCR: NotDetec (03-03-22 @ 18:26)    RADIOLOGY & ADDITIONAL TESTS:  < from: CT Abdomen and Pelvis w/ Oral Cont and w/ IV Cont (03.03.22 @ 14:14) >  IMPRESSION:  Constipated colon with rectal fecal impaction. Thick-walled sigmoid colon   consistent with nonspecific colitis..    Heterogeneous enlarged prostate with intravesicular extension. Distended   bladder resulting in mild bilateral hydroureteronephrosis.    < end of copied text >    Care Discussed with Consultants/Other Providers: Alvin Mendoza

## 2022-03-05 NOTE — PROGRESS NOTE ADULT - ASSESSMENT
hematuria lessening    sterile technique    bladder irrigated with sterile water    no clots yielded    slow CBI resumed

## 2022-03-06 LAB
ANION GAP SERPL CALC-SCNC: 4 MMOL/L — LOW (ref 5–17)
BUN SERPL-MCNC: 12 MG/DL — SIGNIFICANT CHANGE UP (ref 7–23)
CALCIUM SERPL-MCNC: 9.5 MG/DL — SIGNIFICANT CHANGE UP (ref 8.4–10.5)
CHLORIDE SERPL-SCNC: 108 MMOL/L — SIGNIFICANT CHANGE UP (ref 96–108)
CO2 SERPL-SCNC: 30 MMOL/L — SIGNIFICANT CHANGE UP (ref 22–31)
CREAT SERPL-MCNC: 1.04 MG/DL — SIGNIFICANT CHANGE UP (ref 0.5–1.3)
EGFR: 75 ML/MIN/1.73M2 — SIGNIFICANT CHANGE UP
GLUCOSE SERPL-MCNC: 132 MG/DL — HIGH (ref 70–99)
HCT VFR BLD CALC: 32.9 % — LOW (ref 39–50)
HGB BLD-MCNC: 10.7 G/DL — LOW (ref 13–17)
MCHC RBC-ENTMCNC: 31.2 PG — SIGNIFICANT CHANGE UP (ref 27–34)
MCHC RBC-ENTMCNC: 32.5 GM/DL — SIGNIFICANT CHANGE UP (ref 32–36)
MCV RBC AUTO: 95.9 FL — SIGNIFICANT CHANGE UP (ref 80–100)
NRBC # BLD: 0 /100 WBCS — SIGNIFICANT CHANGE UP (ref 0–0)
PLATELET # BLD AUTO: 195 K/UL — SIGNIFICANT CHANGE UP (ref 150–400)
POTASSIUM SERPL-MCNC: 4.1 MMOL/L — SIGNIFICANT CHANGE UP (ref 3.5–5.3)
POTASSIUM SERPL-SCNC: 4.1 MMOL/L — SIGNIFICANT CHANGE UP (ref 3.5–5.3)
RBC # BLD: 3.43 M/UL — LOW (ref 4.2–5.8)
RBC # FLD: 13.8 % — SIGNIFICANT CHANGE UP (ref 10.3–14.5)
SODIUM SERPL-SCNC: 142 MMOL/L — SIGNIFICANT CHANGE UP (ref 135–145)
WBC # BLD: 6.11 K/UL — SIGNIFICANT CHANGE UP (ref 3.8–10.5)
WBC # FLD AUTO: 6.11 K/UL — SIGNIFICANT CHANGE UP (ref 3.8–10.5)

## 2022-03-06 PROCEDURE — 99233 SBSQ HOSP IP/OBS HIGH 50: CPT

## 2022-03-06 RX ADMIN — LOSARTAN POTASSIUM 50 MILLIGRAM(S): 100 TABLET, FILM COATED ORAL at 05:40

## 2022-03-06 RX ADMIN — SENNA PLUS 2 TABLET(S): 8.6 TABLET ORAL at 22:17

## 2022-03-06 RX ADMIN — TAMSULOSIN HYDROCHLORIDE 0.8 MILLIGRAM(S): 0.4 CAPSULE ORAL at 22:18

## 2022-03-06 RX ADMIN — POLYETHYLENE GLYCOL 3350 17 GRAM(S): 17 POWDER, FOR SOLUTION ORAL at 05:40

## 2022-03-06 RX ADMIN — FINASTERIDE 5 MILLIGRAM(S): 5 TABLET, FILM COATED ORAL at 11:31

## 2022-03-06 RX ADMIN — ATORVASTATIN CALCIUM 10 MILLIGRAM(S): 80 TABLET, FILM COATED ORAL at 22:17

## 2022-03-06 RX ADMIN — POLYETHYLENE GLYCOL 3350 17 GRAM(S): 17 POWDER, FOR SOLUTION ORAL at 17:12

## 2022-03-06 NOTE — PROGRESS NOTE ADULT - SUBJECTIVE AND OBJECTIVE BOX
Patient is a 76y old  Male who presents with a chief complaint of Hematuria (06 Mar 2022 07:18)    Patient seen and examined at bedside. Patient denies pain, states was able to ambulate to bathroom to have BM. Denies feeling dizzy/lightheaded. No overnight events reported.     ALLERGIES:  No Known Allergies    MEDICATIONS  (STANDING):  atorvastatin 10 milliGRAM(s) Oral at bedtime  finasteride 5 milliGRAM(s) Oral daily  losartan 50 milliGRAM(s) Oral daily  polyethylene glycol 3350 17 Gram(s) Oral two times a day  senna 2 Tablet(s) Oral at bedtime  tamsulosin 0.8 milliGRAM(s) Oral at bedtime    MEDICATIONS  (PRN):  acetaminophen     Tablet .. 650 milliGRAM(s) Oral every 6 hours PRN Temp greater or equal to 38C (100.4F), Mild Pain (1 - 3)    Vital Signs Last 24 Hrs  T(F): 98.4 (06 Mar 2022 05:18), Max: 99.3 (05 Mar 2022 19:37)  HR: 85 (06 Mar 2022 05:18) (82 - 85)  BP: 116/64 (06 Mar 2022 05:18) (107/64 - 116/64)  RR: 17 (06 Mar 2022 05:18) (17 - 18)  SpO2: 95% (06 Mar 2022 05:18) (95% - 100%)    I&O's Summary  05 Mar 2022 07:01  -  06 Mar 2022 07:00  --------------------------------------------------------  IN: 1200 mL / OUT: 8200 mL / NET: -7000 mL    PHYSICAL EXAM:  General: NAD, A/O x 3  ENT: No gross hearing impairment, Moist mucous membranes, no thrush  Neck: Supple, No JVD  Lungs: Clear to auscultation bilaterally, good air entry, non-labored breathing  Cardio: RRR, S1/S2, No murmur  Abdomen: Soft, Nontender, Nondistended; Bowel sounds present  : Kramer Catheter in place w/ slow CBI irrigating, red drainage present in tube and bag   Extremities: No calf tenderness, No cyanosis, No pitting edema  Psych: Appropriate mood and affect    LABS:                        10.7   6.11  )-----------( 195      ( 06 Mar 2022 05:40 )             32.9     03-06    142  |  108  |  12  ----------------------------<  132  4.1   |  30  |  1.04    Ca    9.5      06 Mar 2022 05:40    TPro  7.0  /  Alb  3.8  /  TBili  1.0  /  DBili  x   /  AST  18  /  ALT  31  /  AlkPhos  75  03-04    COVID-19 PCR: NotDetec (03-03-22 @ 18:26)    RADIOLOGY & ADDITIONAL TESTS:  < from: CT Abdomen and Pelvis w/ Oral Cont and w/ IV Cont (03.03.22 @ 14:14) >  IMPRESSION:  Constipated colon with rectal fecal impaction. Thick-walled sigmoid colon   consistent with nonspecific colitis..    Heterogeneous enlarged prostate with intravesicular extension. Distended   bladder resulting in mild bilateral hydroureteronephrosis.    < end of copied text >    Care Discussed with Consultants/Other Providers:    Patient is a 76y old  Male who presents with a chief complaint of Hematuria (06 Mar 2022 07:18)    Patient seen and examined at bedside. Patient denies pain, states was able to ambulate to bathroom to have BM. Denies feeling dizzy/lightheaded. No overnight events reported.     ALLERGIES:  No Known Allergies    MEDICATIONS  (STANDING):  atorvastatin 10 milliGRAM(s) Oral at bedtime  finasteride 5 milliGRAM(s) Oral daily  losartan 50 milliGRAM(s) Oral daily  polyethylene glycol 3350 17 Gram(s) Oral two times a day  senna 2 Tablet(s) Oral at bedtime  tamsulosin 0.8 milliGRAM(s) Oral at bedtime    MEDICATIONS  (PRN):  acetaminophen     Tablet .. 650 milliGRAM(s) Oral every 6 hours PRN Temp greater or equal to 38C (100.4F), Mild Pain (1 - 3)    Vital Signs Last 24 Hrs  T(F): 98.4 (06 Mar 2022 05:18), Max: 99.3 (05 Mar 2022 19:37)  HR: 85 (06 Mar 2022 05:18) (82 - 85)  BP: 116/64 (06 Mar 2022 05:18) (107/64 - 116/64)  RR: 17 (06 Mar 2022 05:18) (17 - 18)  SpO2: 95% (06 Mar 2022 05:18) (95% - 100%)    I&O's Summary  05 Mar 2022 07:01  -  06 Mar 2022 07:00  --------------------------------------------------------  IN: 1200 mL / OUT: 8200 mL / NET: -7000 mL    PHYSICAL EXAM:  General: NAD, A/O x 3  ENT: No gross hearing impairment, Moist mucous membranes, no thrush  Neck: Supple, No JVD  Lungs: Clear to auscultation bilaterally, good air entry, non-labored breathing  Cardio: RRR, S1/S2, No murmur  Abdomen: Soft, Nontender, Nondistended; Bowel sounds present  : Kramer Catheter in place w/ slow CBI irrigating, red drainage present in tube and bag   Extremities: No calf tenderness, No cyanosis, No pitting edema  Psych: Appropriate mood and affect    LABS:                        10.7   6.11  )-----------( 195      ( 06 Mar 2022 05:40 )             32.9     03-06    142  |  108  |  12  ----------------------------<  132  4.1   |  30  |  1.04    Ca    9.5      06 Mar 2022 05:40    TPro  7.0  /  Alb  3.8  /  TBili  1.0  /  DBili  x   /  AST  18  /  ALT  31  /  AlkPhos  75  03-04    COVID-19 PCR: NotDetec (03-03-22 @ 18:26)    RADIOLOGY & ADDITIONAL TESTS:  < from: CT Abdomen and Pelvis w/ Oral Cont and w/ IV Cont (03.03.22 @ 14:14) >  IMPRESSION:  Constipated colon with rectal fecal impaction. Thick-walled sigmoid colon   consistent with nonspecific colitis..    Heterogeneous enlarged prostate with intravesicular extension. Distended   bladder resulting in mild bilateral hydroureteronephrosis.    < end of copied text >    Care Discussed with Consultants/Other Providers: uro Dr Mendoza

## 2022-03-06 NOTE — PROGRESS NOTE ADULT - ASSESSMENT
76M with PMH of DMII, HTN, BPH s/p TURP, no hx of abdominal surgeries accompanied by wife p/w 1 day of constipation presenting with abdominal distention admitted for sepsis due to stool impaction/stercoral colitis.  Also with urinary retention-guillen catheter attempted and now with hematuria and clots.      #Acute urinary retention  #Hematuria  #BPH s/p TURP  #Acute blood loss anemia  - Cont coude catheter, guillen drainage light red, continue slow CBI until clear    - Monitor H/H with hematuria - HGB downtrending 11.8->10.7  - Continue flomax 0.8 and finasteride  - Most likely will go home with guillen when stable   - Urology following- Dr Mendoza    #Sepsis due to colitis  #Constipation:  - Sepsis present on admission - leukocytosis and tachycardia - likely due to colitis. Sepsis is resolved. Leukocytosis resolved.   - CT AP with nonspecific colitis, constipation with rectal fecal impaction  - Cont bowel regimen  - Monitor BM - having multiple BMs   - Continue low fiber diet  - GI consult appreciated-- outpatient follow up 2 weeks     #Hypertension  - losartan 50mg daily    #DMII  - On metformin at home  - A1c 6.4%  - ISS for now    #DVT ppx  - IPC  - No pharm ppx due to hematuria  - Encourage ambulation    3/5: Updated wife Chantelle at [phone]     Dispo: Suspect home in 24-48 hrs w guillen          76M with PMH of DMII, HTN, BPH s/p TURP, no hx of abdominal surgeries accompanied by wife p/w 1 day of constipation presenting with abdominal distention admitted for sepsis due to stool impaction/stercoral colitis.  Also with urinary retention-guillen catheter attempted and now with hematuria and clots.      #Acute urinary retention  #Hematuria  #BPH s/p TURP  #Acute blood loss anemia  - Cont coude catheter, guillen drainage red, continue slow CBI until clear    - Monitor H/H with hematuria - HGB downtrending 11.8->10.7, asymptomatic   - Continue flomax 0.8 and finasteride  - Most likely will go home with guillen when stable   - Urology following- Dr Mendoza    #Sepsis due to colitis  #Constipation:  - Sepsis present on admission - leukocytosis and tachycardia - likely due to colitis. Sepsis is resolved. Leukocytosis resolved.   - CT AP with nonspecific colitis, constipation with rectal fecal impaction  - Cont bowel regimen  - Monitor BM - having multiple BMs   - Continue low fiber diet  - GI consult appreciated-- outpatient follow up 2 weeks     #Hypertension  - losartan 50mg daily    #DMII  - On metformin at home  - A1c 6.4%  - Sugars are controlled     #DVT ppx  - IPC  - No pharm ppx due to hematuria  - Encourage ambulation    3/5: Updated wife Chantelle at [phone]     Dispo: Suspect home in 24-48 hrs w wilmer          76M with PMH of DMII, HTN, BPH s/p TURP, no hx of abdominal surgeries accompanied by wife p/w 1 day of constipation presenting with abdominal distention admitted for sepsis due to stool impaction/stercoral colitis.  Also with urinary retention-guillen catheter attempted and now with hematuria and clots.      #Acute urinary retention  #Hematuria  #BPH s/p TURP  #Acute blood loss anemia  - Cont coude catheter, guillen drainage red, continue slow CBI until clear    - Monitor H/H with hematuria - HGB downtrending 11.8->10.7, asymptomatic   - Continue flomax 0.8 and finasteride  - Most likely will go home with guillen when stable   - Urology following- Dr Mendoza    #Sepsis due to colitis  #Constipation:  - Sepsis present on admission - leukocytosis and tachycardia - likely due to colitis. Sepsis is resolved. Leukocytosis resolved.   - CT AP with nonspecific colitis, constipation with rectal fecal impaction  - Cont bowel regimen  - Monitor BM - having multiple BMs   - Continue low fiber diet  - GI consult appreciated-- outpatient follow up 2 weeks     #Hypertension  - losartan 50mg daily    #DMII  - On metformin at home  - A1c 6.4%  - Sugars are controlled     #DVT ppx  - IPC  - No pharm ppx due to hematuria  - Encourage ambulation    3/6: Updated wife Chantelle ([phone]) at bedside     Dispo: Suspect home in 24-48 hrs w guillen

## 2022-03-06 NOTE — PROGRESS NOTE ADULT - SUBJECTIVE AND OBJECTIVE BOX
INTERVAL HPI/OVERNIGHT EVENTS:  Patient seen at bedside.  No complaints, he is comfortable.  Tubing continues to drain blood tinged urine.     MEDICATIONS  (STANDING):  atorvastatin 10 milliGRAM(s) Oral at bedtime  finasteride 5 milliGRAM(s) Oral daily  losartan 50 milliGRAM(s) Oral daily  polyethylene glycol 3350 17 Gram(s) Oral two times a day  senna 2 Tablet(s) Oral at bedtime  tamsulosin 0.8 milliGRAM(s) Oral at bedtime    MEDICATIONS  (PRN):  acetaminophen     Tablet .. 650 milliGRAM(s) Oral every 6 hours PRN Temp greater or equal to 38C (100.4F), Mild Pain (1 - 3)      Allergies    No Known Allergies      Vital Signs Last 24 Hrs  T(C): 36.9 (06 Mar 2022 05:18), Max: 37.4 (05 Mar 2022 19:37)  T(F): 98.4 (06 Mar 2022 05:18), Max: 99.3 (05 Mar 2022 19:37)  HR: 85 (06 Mar 2022 05:18) (82 - 85)  BP: 116/64 (06 Mar 2022 05:18) (107/64 - 116/64)  BP(mean): --  RR: 17 (06 Mar 2022 05:18) (17 - 18)  SpO2: 95% (06 Mar 2022 05:18) (95% - 100%)    General: NAD  Cardiovascular: S1, S2 RRR  Respiratory: CTA  CBI running slowly, bag with dark bloody fluid, tubing with blood tinged fluid.     LABS:                        10.7   6.11  )-----------( 195      ( 06 Mar 2022 05:40 )             32.9     03-06    142  |  108  |  12  ----------------------------<  132<H>  4.1   |  30  |  1.04    Ca    9.5      06 Mar 2022 05:40

## 2022-03-06 NOTE — PROGRESS NOTE ADULT - ASSESSMENT
76M with PMH of DMII, HTN, BPH s/p TURP, no hx of abdominal surgeries accompanied by wife p/w 1 day of constipation presenting with abdominal distention admitted for sepsis due to stool impaction/stercoral colitis.  Also with urinary retention-guillen catheter attempted and now with hematuria and clots.      #Acute urinary retention  #Hematuria  #BPH s/p TURP  #Acute blood loss anemia  - Cont coude catheter, guillen drainage red, continue slow CBI until clear    - Monitor H/H with hematuria - HGB downtrending 11.8->10.7, asymptomatic   - Continue flomax 0.8 and finasteride  - likely home tomorrow if h/h stable and urine more clear  - Discussed with Dr. Mendoza.

## 2022-03-07 ENCOUNTER — TRANSCRIPTION ENCOUNTER (OUTPATIENT)
Age: 77
End: 2022-03-07

## 2022-03-07 VITALS
TEMPERATURE: 98 F | RESPIRATION RATE: 15 BRPM | OXYGEN SATURATION: 96 % | HEART RATE: 82 BPM | DIASTOLIC BLOOD PRESSURE: 75 MMHG | SYSTOLIC BLOOD PRESSURE: 126 MMHG

## 2022-03-07 LAB
ANION GAP SERPL CALC-SCNC: 5 MMOL/L — SIGNIFICANT CHANGE UP (ref 5–17)
BUN SERPL-MCNC: 11 MG/DL — SIGNIFICANT CHANGE UP (ref 7–23)
CALCIUM SERPL-MCNC: 9.6 MG/DL — SIGNIFICANT CHANGE UP (ref 8.4–10.5)
CHLORIDE SERPL-SCNC: 106 MMOL/L — SIGNIFICANT CHANGE UP (ref 96–108)
CO2 SERPL-SCNC: 30 MMOL/L — SIGNIFICANT CHANGE UP (ref 22–31)
CREAT SERPL-MCNC: 1.03 MG/DL — SIGNIFICANT CHANGE UP (ref 0.5–1.3)
EGFR: 75 ML/MIN/1.73M2 — SIGNIFICANT CHANGE UP
GLUCOSE SERPL-MCNC: 146 MG/DL — HIGH (ref 70–99)
HCT VFR BLD CALC: 34 % — LOW (ref 39–50)
HGB BLD-MCNC: 11.3 G/DL — LOW (ref 13–17)
MCHC RBC-ENTMCNC: 32 PG — SIGNIFICANT CHANGE UP (ref 27–34)
MCHC RBC-ENTMCNC: 33.2 GM/DL — SIGNIFICANT CHANGE UP (ref 32–36)
MCV RBC AUTO: 96.3 FL — SIGNIFICANT CHANGE UP (ref 80–100)
NRBC # BLD: 0 /100 WBCS — SIGNIFICANT CHANGE UP (ref 0–0)
PLATELET # BLD AUTO: 204 K/UL — SIGNIFICANT CHANGE UP (ref 150–400)
POTASSIUM SERPL-MCNC: 4.1 MMOL/L — SIGNIFICANT CHANGE UP (ref 3.5–5.3)
POTASSIUM SERPL-SCNC: 4.1 MMOL/L — SIGNIFICANT CHANGE UP (ref 3.5–5.3)
RBC # BLD: 3.53 M/UL — LOW (ref 4.2–5.8)
RBC # FLD: 13.7 % — SIGNIFICANT CHANGE UP (ref 10.3–14.5)
SODIUM SERPL-SCNC: 141 MMOL/L — SIGNIFICANT CHANGE UP (ref 135–145)
WBC # BLD: 6.32 K/UL — SIGNIFICANT CHANGE UP (ref 3.8–10.5)
WBC # FLD AUTO: 6.32 K/UL — SIGNIFICANT CHANGE UP (ref 3.8–10.5)

## 2022-03-07 PROCEDURE — 86850 RBC ANTIBODY SCREEN: CPT

## 2022-03-07 PROCEDURE — 86900 BLOOD TYPING SEROLOGIC ABO: CPT

## 2022-03-07 PROCEDURE — 74177 CT ABD & PELVIS W/CONTRAST: CPT | Mod: MA

## 2022-03-07 PROCEDURE — 99239 HOSP IP/OBS DSCHRG MGMT >30: CPT

## 2022-03-07 PROCEDURE — 80053 COMPREHEN METABOLIC PANEL: CPT

## 2022-03-07 PROCEDURE — 87635 SARS-COV-2 COVID-19 AMP PRB: CPT

## 2022-03-07 PROCEDURE — 93005 ELECTROCARDIOGRAM TRACING: CPT

## 2022-03-07 PROCEDURE — 86901 BLOOD TYPING SEROLOGIC RH(D): CPT

## 2022-03-07 PROCEDURE — 36415 COLL VENOUS BLD VENIPUNCTURE: CPT

## 2022-03-07 PROCEDURE — 71045 X-RAY EXAM CHEST 1 VIEW: CPT

## 2022-03-07 PROCEDURE — 80048 BASIC METABOLIC PNL TOTAL CA: CPT

## 2022-03-07 PROCEDURE — 85025 COMPLETE CBC W/AUTO DIFF WBC: CPT

## 2022-03-07 PROCEDURE — 86803 HEPATITIS C AB TEST: CPT

## 2022-03-07 PROCEDURE — 83036 HEMOGLOBIN GLYCOSYLATED A1C: CPT

## 2022-03-07 PROCEDURE — 99285 EMERGENCY DEPT VISIT HI MDM: CPT

## 2022-03-07 PROCEDURE — 85027 COMPLETE CBC AUTOMATED: CPT

## 2022-03-07 PROCEDURE — 82962 GLUCOSE BLOOD TEST: CPT

## 2022-03-07 RX ORDER — TAMSULOSIN HYDROCHLORIDE 0.4 MG/1
1 CAPSULE ORAL
Qty: 0 | Refills: 0 | DISCHARGE

## 2022-03-07 RX ORDER — FINASTERIDE 5 MG/1
1 TABLET, FILM COATED ORAL
Qty: 30 | Refills: 0
Start: 2022-03-07 | End: 2022-04-05

## 2022-03-07 RX ORDER — TAMSULOSIN HYDROCHLORIDE 0.4 MG/1
2 CAPSULE ORAL
Qty: 60 | Refills: 0
Start: 2022-03-07 | End: 2022-04-05

## 2022-03-07 RX ORDER — POLYETHYLENE GLYCOL 3350 17 G/17G
17 POWDER, FOR SOLUTION ORAL
Qty: 476 | Refills: 0
Start: 2022-03-07 | End: 2022-03-20

## 2022-03-07 RX ADMIN — POLYETHYLENE GLYCOL 3350 17 GRAM(S): 17 POWDER, FOR SOLUTION ORAL at 06:12

## 2022-03-07 RX ADMIN — FINASTERIDE 5 MILLIGRAM(S): 5 TABLET, FILM COATED ORAL at 11:47

## 2022-03-07 RX ADMIN — LOSARTAN POTASSIUM 50 MILLIGRAM(S): 100 TABLET, FILM COATED ORAL at 06:12

## 2022-03-07 NOTE — PROGRESS NOTE ADULT - ATTENDING COMMENTS
76M with PMH of DM Type 2, HTN, BPH s/p TURP, no hx of abdominal surgeries accompanied by wife p/w 1 day of constipation presenting with abdominal distention. Admitted for sepsis due to colitis and stool impaction. Currently having multiple BMs, tolerating PO diet.   Continue Kramer for hematuria. H/H slowly downtrending. Uro consulted, recommendations appreciated. Continue CBI until urine more clear.   Discussed with wife Chantelle at bedside, aware and in agreement with above.
76M with PMH of DM Type 2, HTN, BPH s/p TURP, no hx of abdominal surgeries accompanied by wife p/w 1 day of constipation presenting with abdominal distention. Admitted for sepsis due to colitis and stool impaction. Sepsis resolved, constipation resolved.   Acute blood loss anemia due to hematuria from acute urinary retention. Kramer in place. CBI stopped, urine appropriate color. Hematuria resolved. H/H improving. Uro consulted, recommendations appreciated. DC home, with TOV on Thursday.
76M with PMH of DM Type 2, HTN, BPH s/p TURP, no hx of abdominal surgeries accompanied by wife p/w 1 day of constipation presenting with abdominal distention. Admitted for sepsis (present on admission with tachycardia and leukocytosis) due to colitis and stool impaction. GI consulted - bowel regimen adjusted. Patient having multiple BMs.   Also with acute urinary retention, s/p traumatic Kramer. Now with acute blood loss anemia due to hematuria. Hold CBI. Monitor H/H - slowly downtrending. urine remains red. Uro Dr Mendoza following, recommendations appreciated.   Discussed with wife Chantelle at bedside, aware and in agreement with above.
76M with PMH of DMII, HTN, BPH s/p TURP, no hx of abdominal surgeries accompanied by wife p/w 1 day of constipation presenting with abdominal distention due to stool impaction/stercoral colitis.  Also with urinary retention-gulilen catheter attempted and now with hematuria and clots. Continue CBI. Diet advanced to low fiber. Continue bowel regimen. Discussed with wife Chantelle at bedside, aware and in agreement with above.

## 2022-03-07 NOTE — PROGRESS NOTE ADULT - SUBJECTIVE AND OBJECTIVE BOX
Patient is a 76y old  Male who presents with a chief complaint of Hematuria (07 Mar 2022 07:25)  CBI clear.  Eager to go home     Patient seen and examined at bedside.    ALLERGIES:  No Known Allergies    MEDICATIONS  (STANDING):  atorvastatin 10 milliGRAM(s) Oral at bedtime  finasteride 5 milliGRAM(s) Oral daily  losartan 50 milliGRAM(s) Oral daily  polyethylene glycol 3350 17 Gram(s) Oral two times a day  senna 2 Tablet(s) Oral at bedtime  tamsulosin 0.8 milliGRAM(s) Oral at bedtime    MEDICATIONS  (PRN):  acetaminophen     Tablet .. 650 milliGRAM(s) Oral every 6 hours PRN Temp greater or equal to 38C (100.4F), Mild Pain (1 - 3)    Vital Signs Last 24 Hrs  T(F): 98.4 (07 Mar 2022 05:53), Max: 98.7 (06 Mar 2022 15:42)  HR: 82 (07 Mar 2022 05:53) (70 - 82)  BP: 126/75 (07 Mar 2022 05:53) (126/75 - 138/78)  RR: 15 (07 Mar 2022 05:53) (15 - 18)  SpO2: 96% (07 Mar 2022 05:53) (96% - 96%)  I&O's Summary    06 Mar 2022 07:01  -  07 Mar 2022 07:00  --------------------------------------------------------  IN: 3150 mL / OUT: 3700 mL / NET: -550 mL        PHYSICAL EXAM:  General: NAD, A/O x 3  ENT: MMM, no thrush  Neck: Supple, No JVD  Lungs: Non labored breathing,  Clear to auscultation bilaterally,   Cardio: RRR, S1/S2, no pitting edema bilaterally  Abdomen: Soft, Nontender, Nondistended; Bowel sounds present  : guillen w dark colored urine  Extremities: No calf tenderness, moves all extremities    LABS:                        11.3   6.32  )-----------( 204      ( 07 Mar 2022 08:00 )             34.0       03-07    141  |  106  |  11  ----------------------------<  146  4.1   |  30  |  1.03    Ca    9.6      07 Mar 2022 08:00                                        COVID-19 PCR: Loretta (03-03-22 @ 18:26)      RADIOLOGY & ADDITIONAL TESTS:    Care Discussed with Consultants/Other Providers:    Patient is a 76y old  Male who presents with a chief complaint of Hematuria (07 Mar 2022 07:25)    Patient seen and examined at bedside. No overnight events.   CBI stopped, hematuria resolved. Kramer in place    ALLERGIES:  No Known Allergies    MEDICATIONS  (STANDING):  atorvastatin 10 milliGRAM(s) Oral at bedtime  finasteride 5 milliGRAM(s) Oral daily  losartan 50 milliGRAM(s) Oral daily  polyethylene glycol 3350 17 Gram(s) Oral two times a day  senna 2 Tablet(s) Oral at bedtime  tamsulosin 0.8 milliGRAM(s) Oral at bedtime    MEDICATIONS  (PRN):  acetaminophen     Tablet .. 650 milliGRAM(s) Oral every 6 hours PRN Temp greater or equal to 38C (100.4F), Mild Pain (1 - 3)    Vital Signs Last 24 Hrs  T(F): 98.4 (07 Mar 2022 05:53), Max: 98.7 (06 Mar 2022 15:42)  HR: 82 (07 Mar 2022 05:53) (70 - 82)  BP: 126/75 (07 Mar 2022 05:53) (126/75 - 138/78)  RR: 15 (07 Mar 2022 05:53) (15 - 18)  SpO2: 96% (07 Mar 2022 05:53) (96% - 96%)  I&O's Summary    06 Mar 2022 07:01  -  07 Mar 2022 07:00  --------------------------------------------------------  IN: 3150 mL / OUT: 3700 mL / NET: -550 mL        PHYSICAL EXAM:  General: NAD, A/O x 3  ENT: MMM, no thrush  Neck: Supple, No JVD  Lungs: Non labored breathing,  Clear to auscultation bilaterally,   Cardio: RRR, S1/S2, no pitting edema bilaterally  Abdomen: Soft, Nontender, Nondistended; Bowel sounds present  : Kramer w dark appropraitely colored urine  Extremities: No calf tenderness, moves all extremities    LABS:                        11.3   6.32  )-----------( 204      ( 07 Mar 2022 08:00 )             34.0       03-07    141  |  106  |  11  ----------------------------<  146  4.1   |  30  |  1.03    Ca    9.6      07 Mar 2022 08:00                                        COVID-19 PCR: Loretta (03-03-22 @ 18:26)      RADIOLOGY & ADDITIONAL TESTS:    Care Discussed with Consultants/Other Providers: Dr Mendoza urologist

## 2022-03-07 NOTE — PROGRESS NOTE ADULT - SUBJECTIVE AND OBJECTIVE BOX
Patient is a 76y old  Male who presents with a chief complaint of Hematuria (06 Mar 2022 11:59)    HPI:  76M with PMH of DMII, HTN, BPH s/p TURP, no hx of abdominal surgeries accompanied by wife p/w 1 day of constipation with hard pellets, normal flatus, last urination this AM now presenting with abdominal distention. Pt denies fever, chills, cp, sob, palpitations, n/v/d, dysuria. Attempted manual disimpaction in ED unsuccessfully. Senna and prune juice with no relief. Kramer catheter was attempted but failed. Coude catheter was placed and pt had hematuria. Seen by urology. Admitted for monitoring of H/H and clots. (03 Mar 2022 18:24)    CBI now clear    Interval Events:  Patient seen and examined at bedside.    MEDICATIONS:  MEDICATIONS  (STANDING):  atorvastatin 10 milliGRAM(s) Oral at bedtime  finasteride 5 milliGRAM(s) Oral daily  losartan 50 milliGRAM(s) Oral daily  polyethylene glycol 3350 17 Gram(s) Oral two times a day  senna 2 Tablet(s) Oral at bedtime  tamsulosin 0.8 milliGRAM(s) Oral at bedtime    MEDICATIONS  (PRN):  acetaminophen     Tablet .. 650 milliGRAM(s) Oral every 6 hours PRN Temp greater or equal to 38C (100.4F), Mild Pain (1 - 3)      Allergies    No Known Allergies    Intolerances        T(C): 36.9 (03-07-22 @ 05:53), Max: 37.4 (03-05-22 @ 19:37)  T(F): 98.4 (03-07-22 @ 05:53), Max: 99.3 (03-05-22 @ 19:37)  HR: 82 (03-07-22 @ 05:53) (70 - 85)  BP: 126/75 (03-07-22 @ 05:53) (107/64 - 138/78)  RR: 15 (03-07-22 @ 05:53) (15 - 18)  SpO2: 96% (03-07-22 @ 05:53) (95% - 100%)          03-06-22 @ 07:01  -  03-07-22 @ 07:00  --------------------------------------------------------  IN:  Total IN: 0 mL    OUT:    Indwelling Catheter - Urethral (mL): 900 mL  Total OUT: 900 mL    Total NET: -900 mL          LABS:      CBC Full  -  ( 06 Mar 2022 05:40 )  WBC Count : 6.11 K/uL  RBC Count : 3.43 M/uL  Hemoglobin : 10.7 g/dL  Hematocrit : 32.9 %  Platelet Count - Automated : 195 K/uL  Mean Cell Volume : 95.9 fl  Mean Cell Hemoglobin : 31.2 pg  Mean Cell Hemoglobin Concentration : 32.5 gm/dL  Auto Neutrophil # : x  Auto Lymphocyte # : x  Auto Monocyte # : x  Auto Eosinophil # : x  Auto Basophil # : x  Auto Neutrophil % : x  Auto Lymphocyte % : x  Auto Monocyte % : x  Auto Eosinophil % : x  Auto Basophil % : x    03-06    142  |  108  |  12  ----------------------------<  132<H>  4.1   |  30  |  1.04    Ca    9.5      06 Mar 2022 05:40                    Physical Exam    Constitutional: alert, no acute distress    Abdomen: soft, nontender, nondistended, no HSM    Genitourinary: no bladder distention, CBI clear

## 2022-03-07 NOTE — PROGRESS NOTE ADULT - PROBLEM SELECTOR PLAN 1
CBI, monitor hct, transfuse prn
resolved. keep guillen    OK for discharge today. office visit later this week for void trial
continue CBI, monitor hct and transfuse prn
hct noted    hematuria lessening    keep slow CBI .. consider stopping CBI and discharge Sunday

## 2022-03-07 NOTE — DISCHARGE NOTE PROVIDER - PROVIDER TOKENS
PROVIDER:[TOKEN:[[id number]],SCHEDULEDAPPT:[03/10/2022]],PROVIDER:[TOKEN:[[id number]],FOLLOWUP:[2 weeks]]

## 2022-03-07 NOTE — DISCHARGE NOTE NURSING/CASE MANAGEMENT/SOCIAL WORK - NSDCFUADDAPPT_GEN_ALL_CORE_FT
Follow up with Dr Mendoza this week-Thursday We made you a hospital follow-up appointment with Dr. Kurzweil on 3/14/2022 at 3:00pm, office #[phone].    Follow up with Dr Mendoza this week-Thursday

## 2022-03-07 NOTE — DISCHARGE NOTE PROVIDER - NSDCCPCAREPLAN_GEN_ALL_CORE_FT
PRINCIPAL DISCHARGE DIAGNOSIS  Diagnosis: Hematuria  Assessment and Plan of Treatment: You presented to ER with abdominal discomfort and distention  You had a CT scan which revaled constipated colon and fecl impaction and distended bladder.  Guillen catheter was placed - you had bloody urine in catheter. Dr Mendoza consulted and monitored your urine output closely. You were discharged home with guillen cathter and will follow up with Dr Mendoza this week      SECONDARY DISCHARGE DIAGNOSES  Diagnosis: Urinary retention  Assessment and Plan of Treatment:     Diagnosis: Colitis  Assessment and Plan of Treatment:     Diagnosis: Hematuria  Assessment and Plan of Treatment:      PRINCIPAL DISCHARGE DIAGNOSIS  Diagnosis: Hematuria  Assessment and Plan of Treatment: You presented to ER with abdominal discomfort and distention  -You had a Kramer catheter placed and bloody urine. You were started on continuous bladder irrigation and your urine cleared up. You were evaluated by Dr Mendoza, urologist, who recommended keeping the Kramer in place and seeing him in the office for removal.   -See Dr Mendoza THIS THURSDAY  -TAKE FLOMAX .8MG DAILY (double your home dose). TAKE PROSCAR (available at your pharmacy)  -Follow up with your primary care physician within the week      SECONDARY DISCHARGE DIAGNOSES  Diagnosis: Colitis  Assessment and Plan of Treatment: You had a CT scan which showed constipated colon and fecal impaction and distended bladder.   -You were placed on a bowel regimen and had multiple bowel movements. Continue Miralax  -Follow up with your gastroenterologist within the week

## 2022-03-07 NOTE — DISCHARGE NOTE PROVIDER - CARE PROVIDER_API CALL
Amos Mendoza  UROLOGY  [address] [place] [address], [address]  [place], [place] [id number]  Phone: [phone]  Fax: [phone]  Scheduled Appointment: 03/10/2022    Juan Diego Vanessa)  Gastroenterology; Internal Medicine  [address] [place] [address]  Phone: [phone]  Fax: [phone]  Follow Up Time: 2 weeks

## 2022-03-07 NOTE — PROGRESS NOTE ADULT - ASSESSMENT
76M with PMH of DMII, HTN, BPH s/p TURP, no hx of abdominal surgeries accompanied by wife p/w 1 day of constipation presenting with abdominal distention admitted for sepsis due to stool impaction/stercoral colitis.  Also with urinary retention-guillen catheter attempted and now with hematuria and clots.      #Acute urinary retention  #Hematuria  #BPH s/p TURP  #Acute blood loss anemia  - Cont coude catheter, guillen drainage red, continue slow CBI until clear    - Monitor H/H with hematuria - HGB downtrending 11.3 today, asymptomatic   - Continue flomax 0.8 and finasteride  - CBI stopped--plan to dc home with guillen and outpatient follow up with Dr Mendoza Thursday for TOV   - Urology following- Dr Mendoza    #Sepsis due to colitis  #Constipation:  - Sepsis present on admission - leukocytosis and tachycardia - likely due to colitis. Sepsis is resolved. Leukocytosis resolved.   - CT AP with nonspecific colitis, constipation with rectal fecal impaction  - Cont bowel regimen  - Monitor BM - having multiple BMs   - Continue low fiber diet  - GI consult appreciated-- outpatient follow up 2 weeks     #Hypertension  - losartan 50mg daily    #DMII  - On metformin at home  - A1c 6.4%  - Sugars are controlled     #DVT ppx  - IPC  - No pharm ppx due to hematuria  - Encourage ambulation    3/7: Updated wife Chantelle ([phone]). She will pick pt up at 12 PM today    Dispo: home today with guillen. Wife will pick patient up . Pt will follow up with Dr Mendoza Thursday          76M with PMH of DMII, HTN, BPH s/p TURP, no hx of abdominal surgeries accompanied by wife p/w 1 day of constipation presenting with abdominal distention admitted for sepsis due to stool impaction/stercoral colitis.  Also with urinary retention-guillen catheter attempted and now with hematuria and clots.      #Acute urinary retention  #Hematuria - resolved  #BPH s/p TURP  #Acute blood loss anemia  - Cont coude catheter, guillen drainage red, continue slow CBI until clear    - Monitor H/H with hematuria - improving  - Continue flomax 0.8 and finasteride  - CBI stopped--plan to dc home with Guillen and outpatient follow up with Dr Mendoza Thursday for TOV   - Urology following- Dr Mendoza    #Sepsis due to colitis  #Constipation:  - Sepsis present on admission - leukocytosis and tachycardia - likely due to colitis. Sepsis is resolved. Leukocytosis resolved.   - CT AP with nonspecific colitis, constipation with rectal fecal impaction  - Cont bowel regimen  - Monitor BM   - GI consult appreciated-- outpatient follow up 2 weeks     #Hypertension  - losartan 50mg daily  - Monitor vitals    #DMII  - On metformin at home  - A1c 6.4%  - Sugars are controlled     #DVT ppx  - IPC  - No pharm ppx due to hematuria  - Encourage ambulation    3/7: Updated wife Chantelle ([phone]). She will pick pt up at 12 PM today    Dispo: home today with guillen. Wife will pick patient up . Pt will follow up with Dr Mendoza Thursday

## 2022-03-07 NOTE — DISCHARGE NOTE NURSING/CASE MANAGEMENT/SOCIAL WORK - NSDCPEFALRISK_GEN_ALL_CORE
For information on Fall & Injury Prevention, visit: [email or web address] OR  [email or web address] OR  https://www.cdc.gov/steadi/patient.html

## 2022-03-07 NOTE — DISCHARGE NOTE PROVIDER - ATTENDING DISCHARGE PHYSICAL EXAMINATION:
Vital Signs Last 24 Hrs  T(C): 36.9 (07 Mar 2022 05:53), Max: 37.1 (06 Mar 2022 15:42)  T(F): 98.4 (07 Mar 2022 05:53), Max: 98.7 (06 Mar 2022 15:42)  HR: 82 (07 Mar 2022 05:53) (70 - 82)  BP: 126/75 (07 Mar 2022 05:53) (126/75 - 138/78)  BP(mean): --  RR: 15 (07 Mar 2022 05:53) (15 - 18)  SpO2: 96% (07 Mar 2022 05:53) (96% - 96%)    PHYSICAL EXAM:  GENERAL: NAD  HEAD:  AT/NC   EYES: EOMI, PERRL, conjunctiva and sclera clear  NERVOUS SYSTEM:  Alert & Oriented X3, Good concentration; Moves all extremities   CHEST/LUNG: Clear to percussion bilaterally; No rales, rhonchi, wheezing, or rubs  HEART: Regular rate and rhythm; No murmurs, rubs, or gallops  ABDOMEN: Soft, Nontender, Nondistended; Bowel sounds present  : Kramer present draining appropriate colored urine

## 2022-03-07 NOTE — DISCHARGE NOTE NURSING/CASE MANAGEMENT/SOCIAL WORK - PATIENT PORTAL LINK FT
You can access the FollowMyHealth Patient Portal offered by [facility] by registering at the following website: [email or web address]. By joining [employer]’s FollowMyHealth portal, you will also be able to view your health information using other applications (apps) compatible with our system.

## 2022-03-07 NOTE — DISCHARGE NOTE PROVIDER - HOSPITAL COURSE
76M with PMH of DMII, HTN, BPH s/p TURP, no hx of abdominal surgeries accompanied by wife p/w 1 day of constipation with hard pellets, normal flatus, last urination this AM now presenting with abdominal distention. Guillen  CTAP with constipated colon and rectal fecal impaction/ bladder distention.  Coude catheter was placed and pt had hematuria. Admitted to hospitalist .     Urology , Dr Mendoza followed patient during hospital course.  Guillen placed on CBI and hematuria closely monitored. Flomax increased 0.8 daily.   Hgb 14.3--> 10.6--> 11.3. On 3/7, hematuria resolved and urology discontineud CBI.  Patient discharged home with guillen and outpatient follow up with Dr Mendoza Thursday for TOV    For fecal impaction, GI consulted and patient given aggressive bowel regimen with improvement.  Pt should follow up with GI in 2 weeks    Medically stable for dc home      *RESULTS*  < from: CT Abdomen and Pelvis w/ Oral Cont and w/ IV Cont (03.03.22 @ 14:14) >    IMPRESSION:  Constipated colon with rectal fecal impaction. Thick-walled sigmoid colon   consistent with nonspecific colitis..    Heterogeneous enlarged prostate with intravesicular extension. Distended   bladder resulting in mild bilateral hydroureteronephrosis.        --- End of Report ---    < end of copied text >     76M with PMH of DMII, HTN, BPH s/p TURP, no hx of abdominal surgeries accompanied by wife p/w 1 day of constipation with hard pellets, normal flatus, last urination this AM now presenting with abdominal distention. Guillen  CTAP with constipated colon and rectal fecal impaction/ bladder distention.  Coude catheter was placed and pt had hematuria. Admitted to hospitalist .     Urology , Dr Mendoza followed patient during hospital course.  Guillen placed on CBI and hematuria closely monitored. Flomax increased 0.8 daily.   Hgb 14.3--> 10.6--> 11.3. On 3/7, hematuria resolved and urology discontineud CBI.  Patient discharged home with guillen and outpatient follow up with Dr Mendoza Thursday for TOV    For fecal impaction, GI consulted and patient given aggressive bowel regimen with improvement.  Pt should follow up with GI in 2 weeks. He should maintain high fiber diet at discharge     Medically stable for dc home      *RESULTS*  < from: CT Abdomen and Pelvis w/ Oral Cont and w/ IV Cont (03.03.22 @ 14:14) >    IMPRESSION:  Constipated colon with rectal fecal impaction. Thick-walled sigmoid colon   consistent with nonspecific colitis..    Heterogeneous enlarged prostate with intravesicular extension. Distended   bladder resulting in mild bilateral hydroureteronephrosis.        --- End of Report ---    < end of copied text >

## 2022-03-29 ENCOUNTER — RX CHANGE (OUTPATIENT)
Age: 77
End: 2022-03-29

## 2022-03-31 ENCOUNTER — TRANSCRIPTION ENCOUNTER (OUTPATIENT)
Age: 77
End: 2022-03-31

## 2022-04-04 ENCOUNTER — TRANSCRIPTION ENCOUNTER (OUTPATIENT)
Age: 77
End: 2022-04-04

## 2022-04-11 ENCOUNTER — APPOINTMENT (OUTPATIENT)
Dept: NEUROLOGY | Facility: CLINIC | Age: 77
End: 2022-04-11
Payer: MEDICARE

## 2022-04-11 VITALS
SYSTOLIC BLOOD PRESSURE: 146 MMHG | HEART RATE: 73 BPM | DIASTOLIC BLOOD PRESSURE: 78 MMHG | WEIGHT: 164 LBS | HEIGHT: 64 IN | BODY MASS INDEX: 28 KG/M2

## 2022-04-11 PROCEDURE — 99213 OFFICE O/P EST LOW 20 MIN: CPT

## 2022-04-11 NOTE — HISTORY OF PRESENT ILLNESS
[FreeTextEntry1] : \par Mr. Nick Rainey returned to the office having been last seen on September 29, 2021. He is a 76-year-old right-handed gentleman with lumbar and cervical spondylosis.  He reports occasional morning low back pressure without sciatica.  He denies weakness or numbness in his legs.  He complains of nocturia. MRI of the lumbar spine revealed severe stenosis at L4-5.  There was compression particularly of the left L5 nerve root.\par \par He is undergoing vestibular therapy at [facility]. He underwent an MRI of the brain on September 23, 2021. That study revealed mild chronic bilateral inferior frontal encephalomalacia worse on the right likely due to traumatic brain injury. There was mild scattered chronic microvascular ischemic change in the periventricular white matter.  He has occasional positional vertigo.\par \par Medications include atorvastatin, dutasteride, tamsulosin, losartan and Metformin.

## 2022-04-11 NOTE — ASSESSMENT
[FreeTextEntry1] : Mr. Rainey is a 76-year-old who suffers relatively quiescent and lumbar spine stenosis and vestibulopathy.  He is undergoing physical therapy.  A recent carotid Doppler did not reveal significant stenosis.  I suggested cautious observation.  He will return to office in 6 months for routine follow-up.  Telephone contact will be maintained in the meantime.

## 2022-04-11 NOTE — PHYSICAL EXAM
[FreeTextEntry1] : Constitutional:  Patient was well-developed, well-nourished and in no acute distress. \par \par Head:  Normocephalic, atraumatic. Tympanic membranes were clear. \par \par Neck:  Supple with full range of motion. \par \par Cardiovascular:  Cardiac rhythm was regular without murmur. There were no carotid bruits. Peripheral pulses were full and symmetric. \par \par Respiratory:  Lungs were clear. \par \par Abdomen:  Soft and nontender. \par \par Spine:  Nontender.  There was no pain on straight leg raising.  Wade's maneuver was negative.\par \par Skin:  There were no rashes. \par \par NEUROLOGICAL EXAMINATION:\par \par Mental Status: He was alert and oriented. Speech was fluent. There was no dysarthria. \par \par Cranial Nerves: \par \par II: Pupils were surgical.  He could finger count bilaterally.   Visual fields were full.  Fundi were not visualized.\par \par III, IV, VI:  Eye movements were full without nystagmus. \par \par V: Facial sensation was intact. \par \par VII: Facial strength was normal. \par \par VIII: Hearing was diminished bilaterally.\par \par IX, X: Palatal movement was normal. Phonation was normal. \par \par XI: Sternocleidomastoids and trapezii were normal. \par \par XII: Tongue was midline and movements normal. There was no lingual atrophy or fasciculations. \par \par Motor Examination: Muscle bulk, tone and strength were normal.  He was able to stand on his toes and heels without difficulty.\par \par Sensory Examination: Pinprick, vibration and joint position sense were intact. \par \par Reflexes: DTRs were 2 throughout except for the ankle jerks which were absent.\par \par Plantar Responses: Plantar responses were flexor. \par \par Coordination/Cerebellar Function: There was no dysmetria on finger to nose or heel to shin testing. \par \par Gait/Stance: Gait was normal. Romberg was negative. Tandem was mildly unsteady.\par

## 2022-05-23 ENCOUNTER — APPOINTMENT (OUTPATIENT)
Dept: ORTHOPEDIC SURGERY | Facility: CLINIC | Age: 77
End: 2022-05-23
Payer: MEDICARE

## 2022-05-23 DIAGNOSIS — M75.20 BICIPITAL TENDINITIS, UNSPECIFIED SHOULDER: ICD-10-CM

## 2022-05-23 DIAGNOSIS — M25.511 PAIN IN RIGHT SHOULDER: ICD-10-CM

## 2022-05-23 PROCEDURE — 73030 X-RAY EXAM OF SHOULDER: CPT | Mod: RT

## 2022-05-23 PROCEDURE — 99203 OFFICE O/P NEW LOW 30 MIN: CPT

## 2022-05-24 NOTE — PHYSICAL EXAM
[de-identified] : Right Upper Extremity\par o Shoulder :\par ¦ Inspection/Palpation : tenderness over the biceps tendons, no swelling, no deformities\par ¦ Range of Motion : ACTIVE FORWARD ELEVATION: Measured at 110 degrees,  ACTIVE EXTERNAL ROTATION: Measured at 70 degrees, ACTIVE INTERNAL ROTATION: Measured at L1\par ¦ Strength : external rotation 5/5, internal rotation 5/5, supraspinatus 5/5\par ¦ Stability : no joint instability on provocative testing\par ¦ Tests/Signs : Neer (+), Trujillo (+) Speed (+)\par o Upper Arm : no tenderness, no swelling, no deformities\par o Muscle Bulk : no atrophy\par o Sensation : sensation intact to light touch\par o Skin : no skin rash or discoloration\par o Vascular Exam : no edema, no cyanosis, radial and ulnar pulses normal\par  [de-identified] : \par o Right Shoulder : Grashey, axillary and outlet views were obtained, there are no soft tissue abnormalities, alignment is normal, no fractures, mild GH DJD, moderate AC DJD with ossific body at the superior aspect of the joint, degenerative change at the greater tuberosity, normal bone density, no bony lesions\par

## 2022-05-24 NOTE — DISCUSSION/SUMMARY
[de-identified] : The underlying pathophysiology was reviewed in great detail with the patient as well as the various treatment options, including ice, analgesics, NSAIDS, Physical therapy, steroid injections.\par \par He would like to proceed with conservative management.\par \par A home exercise sheet was provided.\par \par F/U in 6 weeks\par

## 2022-06-06 ENCOUNTER — APPOINTMENT (OUTPATIENT)
Dept: ORTHOPEDIC SURGERY | Facility: CLINIC | Age: 77
End: 2022-06-06
Payer: MEDICARE

## 2022-06-06 DIAGNOSIS — M75.41 IMPINGEMENT SYNDROME OF RIGHT SHOULDER: ICD-10-CM

## 2022-06-06 PROCEDURE — 20610 DRAIN/INJ JOINT/BURSA W/O US: CPT | Mod: RT

## 2022-06-06 PROCEDURE — 99213 OFFICE O/P EST LOW 20 MIN: CPT | Mod: 25

## 2022-06-06 NOTE — PROCEDURE
[de-identified] : At this point I recommended a therapeutic injection and under sterile precautions an injection of 4 cc 1% lidocaine with 0.5 cc of Kenalog and 0.5 cc of Dexamethasone- was placed into the subacromial space of the right shoulder without complication, and after several minutes, the patient felt significant relief.		  03/05/2020

## 2022-06-06 NOTE — PHYSICAL EXAM
[Normal RUE] : Right Upper Extremity: No scars, rashes, lesions, ulcers, skin intact [Normal Touch] : sensation intact for touch [Normal] : Alert and in no acute distress [de-identified] : Right Upper Extremity\par o Shoulder :\par ¦ Inspection/Palpation : tenderness over the greater tuberosity, no swelling, no deformities\par ¦ Range of Motion : ACTIVE FORWARD ELEVATION: Measured at 95 degrees,  ACTIVE EXTERNAL ROTATION: Measured at 50 degrees, ACTIVE INTERNAL ROTATION: Measured at L1\par ¦ Strength : external rotation 5/5, internal rotation 5/5, supraspinatus 4/5 with pain\par ¦ Stability : no joint instability on provocative testing\par ¦ Tests/Signs : Neer (+), Trujillo (+) Speed (+)\par o Upper Arm : no tenderness, no swelling, no deformities\par o Muscle Bulk : no atrophy\par o Sensation : sensation intact to light touch\par o Skin : no skin rash or discoloration\par o Vascular Exam : no edema, no cyanosis, radial and ulnar pulses normal\par  [de-identified] : XR from 5/23/2022\par o Right Shoulder : Grashey, axillary and outlet views were obtained, there are no soft tissue abnormalities, alignment is normal, no fractures, mild GH DJD, moderate AC DJD with ossific body at the superior aspect of the joint, degenerative change at the greater tuberosity, normal bone density, no bony lesions\par

## 2022-06-06 NOTE — DISCUSSION/SUMMARY
[de-identified] : The underlying pathophysiology was reviewed in great detail with the patient as well as the various treatment options, including ice, analgesics, NSAIDS, Physical therapy, steroid injections.\par \par A right shoulder cortisone injection was provided today in office for symptomatic relief. Patient tolerated procedure well. Given patient's history of diabetes, risks were discussed prior to the injection. Patient was advised to monitor his diet and glucose levels for the next few days. Patient understands and agrees with plan.\par \par Activity modifications and restrictions were discussed. I advised avoiding overhead lifting. I advised the patient to work on good posture. \par \par Continue with home exercises. \par \par F/U in 6 weeks\par \par All questions were answered, all alternatives discussed and the patient is in complete agreement with that plan. Follow-up appointment as instructed. Any issues and the patient will call or come in sooner. \par

## 2022-06-06 NOTE — HISTORY OF PRESENT ILLNESS
[de-identified] : Mr. ASHLEY ZHENG is a RHD 76 year male who presents to office for follow up of anterior right shoulder pain x 1.5 months. Denies injury, trauma, or fall to his shoulder. Describes a constant dull/achy pain in the anterior and posterior aspect of his shoulder which is aggravated with overhead movement and lifting. He also notes pain at times waking him up at night. He tried Meloxicam 7.5 mg as per his PCP, which didn't help the pain. Patient has been doing home exercises, as instructed, however he reports the pain remains the same. He takes Tylenol for pain as needed, which helps a little. He has not had any cortisone injections for the shoulder. \par \par All review of systems, family history, social history, surgical history, past medical history, medications, and allergies not previously stated as positive are negative. They were reviewed by me today with the patient and documented accordingly.

## 2022-11-11 ENCOUNTER — APPOINTMENT (OUTPATIENT)
Dept: NEUROLOGY | Facility: CLINIC | Age: 77
End: 2022-11-11

## 2022-11-11 VITALS
WEIGHT: 170 LBS | BODY MASS INDEX: 29.02 KG/M2 | SYSTOLIC BLOOD PRESSURE: 139 MMHG | DIASTOLIC BLOOD PRESSURE: 82 MMHG | HEIGHT: 64 IN | HEART RATE: 76 BPM

## 2022-11-11 DIAGNOSIS — H81.10 BENIGN PAROXYSMAL VERTIGO, UNSPECIFIED EAR: ICD-10-CM

## 2022-11-11 PROCEDURE — 99214 OFFICE O/P EST MOD 30 MIN: CPT

## 2022-11-11 NOTE — ASSESSMENT
[FreeTextEntry1] : Mr. Rainey is a 77-year-old with lumbar spine stenosis and vestibulopathy.  He complains of recent exacerbation of left-sided positional dizziness.  He will be referred for vestibular therapy reassessment.  He will return to the office in 6 months for routine follow-up.  Telephone contact will be maintained in the meantime.

## 2022-11-11 NOTE — HISTORY OF PRESENT ILLNESS
[FreeTextEntry1] : \par Mr. Nick Rainey returned to the office having been last seen on September 29, 2021. He is a 77-year-old right-handed gentleman with lumbar and cervical spondylosis.  MRI of the lumbar spine revealed severe stenosis at L4-5.  There was compression particularly of the left L5 nerve root.  He denies any recent back discomfort.  He is using a back brace.\par \par He reports a recent recurrence of vertigo when he lays on his left side.  He is not currently undergoing vestibular therapy.  He is hard of hearing and uses hearing aids. \par \par Medications include atorvastatin, dutasteride, tamsulosin, losartan and Metformin.

## 2022-11-11 NOTE — PHYSICAL EXAM
[FreeTextEntry1] : Constitutional:  Patient was well-developed, well-nourished and in no acute distress. \par \par Head:  Normocephalic, atraumatic. Tympanic membranes were clear. \par \par Neck:  Supple with full range of motion. \par \par Cardiovascular:  Cardiac rhythm was regular without murmur. There were no carotid bruits. Peripheral pulses were full and symmetric. \par \par Respiratory:  Lungs were clear. \par \par Abdomen:  Soft and nontender. \par \par Spine:  Nontender.  There was no pain on straight leg raising.  Wade's maneuver was negative.\par \par Skin:  There were no rashes. \par \par NEUROLOGICAL EXAMINATION:\par \par Mental Status: He was alert and oriented. Speech was fluent. There was no dysarthria. \par \par Cranial Nerves: \par \par II: Pupils were surgical.  He could finger count bilaterally.   Visual fields were full.  Fundi were not visualized.\par \par III, IV, VI:  Eye movements were full without nystagmus. \par \par V: Facial sensation was intact. \par \par VII: Facial strength was normal. \par \par VIII: Hearing was diminished bilaterally.  When placed in the left lateral position, complained of dizziness but not on the right side.  There was no nystagmus.\par \par IX, X: Palatal movement was normal. Phonation was normal. \par \par XI: Sternocleidomastoids and trapezii were normal. \par \par XII: Tongue was midline and movements normal. There was no lingual atrophy or fasciculations. \par \par Motor Examination: Muscle bulk, tone and strength were normal.  He was able to stand on his toes and heels without difficulty.\par \par Sensory Examination: Pinprick, vibration and joint position sense were intact. \par \par Reflexes: DTRs were 2 throughout except for the ankle jerks which were 1+.\par \par Plantar Responses: Plantar responses were flexor. \par \par Coordination/Cerebellar Function: There was no dysmetria on finger to nose or heel to shin testing. \par \par Gait/Stance: Gait was normal. Romberg was negative. Tandem was mildly unsteady.\par

## 2022-12-01 RX ORDER — MECLIZINE HYDROCHLORIDE 25 MG/1
25 TABLET ORAL 3 TIMES DAILY
Qty: 90 | Refills: 1 | Status: ACTIVE | COMMUNITY
Start: 2022-12-01 | End: 1900-01-01

## 2023-06-02 ENCOUNTER — APPOINTMENT (OUTPATIENT)
Dept: NEUROLOGY | Facility: CLINIC | Age: 78
End: 2023-06-02
Payer: MEDICARE

## 2023-06-02 VITALS
DIASTOLIC BLOOD PRESSURE: 77 MMHG | BODY MASS INDEX: 29.37 KG/M2 | HEIGHT: 64 IN | HEART RATE: 66 BPM | SYSTOLIC BLOOD PRESSURE: 137 MMHG | WEIGHT: 172 LBS

## 2023-06-02 PROCEDURE — 99213 OFFICE O/P EST LOW 20 MIN: CPT

## 2023-06-02 NOTE — PHYSICAL EXAM
[FreeTextEntry1] : Constitutional:  Patient was well-developed, well-nourished and in no acute distress. \par \par Head:  Normocephalic, atraumatic. Tympanic membranes were clear. \par \par Neck:  Supple with full range of motion. \par \par Cardiovascular:  Cardiac rhythm was regular without murmur. There were no carotid bruits. Peripheral pulses were full and symmetric. \par \par Respiratory:  Lungs were clear. \par \par Abdomen:  Soft and nontender. \par \par Spine:  Nontender. \par  \par Skin:  There were no rashes. \par \par NEUROLOGICAL EXAMINATION:\par \par Mental Status: He was alert and oriented. Speech was fluent. There was no dysarthria. \par \par Cranial Nerves: \par \par II: Pupils were surgical.  He could finger count bilaterally.   Visual fields were full.  Fundi were not visualized.\par \par III, IV, VI:  Eye movements were full without nystagmus. \par \par V: Facial sensation was intact. \par \par VII: Facial strength was normal. \par \par VIII: Hearing was diminished bilaterally. \par \par IX, X: Palatal movement was normal. Phonation was normal. \par \par XI: Sternocleidomastoids and trapezii were normal. \par \par XII: Tongue was midline and movements normal. There was no lingual atrophy or fasciculations. \par \par Motor Examination: Muscle bulk, tone and strength were normal.  He was able to stand on his toes and heels without difficulty.\par \par Sensory Examination: Pinprick, vibration and joint position sense were intact. \par \par Reflexes: DTRs were 2 throughout except for the ankle jerks which were 1+ on the right and absent on the left.\par \par Plantar Responses: Plantar responses were flexor. \par \par Coordination/Cerebellar Function: There was no dysmetria on finger to nose or heel to shin testing. \par \par Gait/Stance: Gait was normal. Romberg was negative. Tandem was mildly unsteady.\par

## 2023-06-02 NOTE — HISTORY OF PRESENT ILLNESS
[FreeTextEntry1] : \par Mr. Nick Rainey returned to the office having been last seen on November 11, 2022. He is a 77-year-old right-handed gentleman with lumbar and cervical spondylosis.  MRI of the lumbar spine revealed severe stenosis at L4-5.  There was compression particularly of the left L5 nerve root.  \par \par Since last seen, he was evaluated by vestibular therapy.  He reports resolution of his dizziness.  He does have a tendency to drift towards his left.  He is hearing impaired.  He denies any neck pain.  He has occasional low back pain in the morning.  He denies sciatica.  He complains of nocturia x3.  He denies pedal complaints.\par \par Medications include atorvastatin, dutasteride, tamsulosin, losartan and Metformin.\par \par Past surgical history is notable for cataract extractions and TURP.  He suffers of hypertension, hyperlipidemia, glaucoma and diabetes.  He has no drug allergies.  He is a former smoker and social drinker.  He is .  Family history is notable for ovarian cancer and cardiac disease.

## 2023-06-02 NOTE — ASSESSMENT
[FreeTextEntry1] : Mr. Rainey is a 77-year-old who suffers from cervical and lumbar spondylosis and vestibulopathy.  He appears to be doing well in all regards.  I suggested core strengthening and avoidance of any activity which might impact his spine.  I suggested a routine follow-up visit in 6 months.  He declines a follow-up carotid Doppler.  Further management will depend upon his clinical course.

## 2023-07-28 ENCOUNTER — NON-APPOINTMENT (OUTPATIENT)
Age: 78
End: 2023-07-28

## 2023-08-06 ENCOUNTER — NON-APPOINTMENT (OUTPATIENT)
Age: 78
End: 2023-08-06

## 2023-12-31 ENCOUNTER — NON-APPOINTMENT (OUTPATIENT)
Age: 78
End: 2023-12-31

## 2024-01-02 ENCOUNTER — APPOINTMENT (OUTPATIENT)
Dept: ORTHOPEDIC SURGERY | Facility: CLINIC | Age: 79
End: 2024-01-02
Payer: MEDICARE

## 2024-01-02 ENCOUNTER — NON-APPOINTMENT (OUTPATIENT)
Age: 79
End: 2024-01-02

## 2024-01-02 VITALS — WEIGHT: 170 LBS | HEIGHT: 64 IN | BODY MASS INDEX: 29.02 KG/M2

## 2024-01-02 PROCEDURE — 99213 OFFICE O/P EST LOW 20 MIN: CPT

## 2024-01-02 NOTE — ADDENDUM
[FreeTextEntry1] :  I, Trish Reza wrote this note acting as a scribe for Dr. Thaddeus Aguirre on Jan 02, 2024.

## 2024-01-02 NOTE — DISCUSSION/SUMMARY
[FreeTextEntry1] :  The underlying pathophysiology was reviewed with the patient. XR films were reviewed with the patient. Discussed at length the nature of the patients condition.   At this stage, patient was advised to take OTC medication such as Tylenol for pain management. At this stage I vinnie taped his pinky to the ring finger which will stabilize the finger and allow for ROM.   All questions answered, understanding verbalized. Patient in agreement with plan of care. Patient may follow up in 2 weeks.

## 2024-01-02 NOTE — PHYSICAL EXAM
[de-identified] :  Patient is WDWN, alert, and in no acute distress. Breathing is unlabored. He is grossly oriented to person, place, and time.  Right Hand:   Tenderness is present  Edema is present  No deformity Limited ROM  Sensation intact in all digits   [de-identified] : No imaging done today (01/02/24)

## 2024-01-02 NOTE — END OF VISIT
[FreeTextEntry3] :  All medical record entries made by the Scribe were at my,  Dr. Thaddeus Aguirre MD., direction and personally dictated by me on 01/02/2024. I have personally reviewed the chart and agree that the record accurately reflects my personal performance of the history, physical exam, assessment and plan.

## 2024-01-04 ENCOUNTER — APPOINTMENT (OUTPATIENT)
Dept: ORTHOPEDIC SURGERY | Facility: CLINIC | Age: 79
End: 2024-01-04
Payer: MEDICARE

## 2024-01-04 VITALS — HEIGHT: 64 IN | BODY MASS INDEX: 29.02 KG/M2 | WEIGHT: 170 LBS

## 2024-01-04 DIAGNOSIS — M10.041 IDIOPATHIC GOUT, RIGHT HAND: ICD-10-CM

## 2024-01-04 DIAGNOSIS — M10.08: ICD-10-CM

## 2024-01-04 PROCEDURE — 99213 OFFICE O/P EST LOW 20 MIN: CPT | Mod: 25

## 2024-01-04 PROCEDURE — 20612 ASPIRATE/INJ GANGLION CYST: CPT

## 2024-01-04 RX ORDER — INDOMETHACIN 50 MG/1
50 CAPSULE ORAL TWICE DAILY
Qty: 28 | Refills: 0 | Status: ACTIVE | COMMUNITY
Start: 2024-01-04 | End: 1900-01-01

## 2024-01-04 RX ORDER — CEFADROXIL 500 MG/1
500 CAPSULE ORAL TWICE DAILY
Qty: 28 | Refills: 0 | Status: ACTIVE | COMMUNITY
Start: 2024-01-04 | End: 1900-01-01

## 2024-01-04 NOTE — DISCUSSION/SUMMARY
[FreeTextEntry1] : The underlying pathophysiology was reviewed with the patient. XR films were reviewed with the patient. Discussed at length the nature of the patients condition.   At this time, treatment options were discussed including; observation, aspiration, and surgical excision. At this point, I recommend an aspiration. Under sterile precautions, 0.1 cc of clear white fmaterial was aspirated from the right hand. The patient was informed that there is a probability that the cyst will refill and may warrant another aspiration. He may return to this office if this occurs.  RX: Duracef 500 DID for 14 days RX: Indomethacin 50mg BID for 14 days, take with food   All questions answered, understanding verbalized. Patient in agreement with plan of care. Patient may follow up in a week.

## 2024-01-04 NOTE — HISTORY OF PRESENT ILLNESS
[FreeTextEntry1] : Patient is a 78 year-old, -hand-dominant male who presents to the office today for initial evaluation of an injury of the right small finger. Today, Jan 04, 2024 patient is here for a follow up for his inflammation on his hand.   No bleeding, fever, chills, sweats, nausea or vomiting were endorsed at this visit. The above history is in addition to the intake form, which I personally reviewed at length, including the patient's medical, surgical, and family history. The patient's allergies were also carefully reviewed. In addition, the family and social history of the patient were also reviewed, which are non-contributory to this visit unless specified above. All of this has been documented accordingly in the visit note.

## 2024-01-04 NOTE — ADDENDUM
[FreeTextEntry1] :  I, Trish Reza wrote this note acting as a scribe for Dr. Thaddeus Aguirre on Jan 04, 2024.

## 2024-01-04 NOTE — END OF VISIT
[FreeTextEntry3] :  All medical record entries made by the Scribe were at my,  Dr. Thaddeus Aguirre MD., direction and personally dictated by me on 01/04/2024. I have personally reviewed the chart and agree that the record accurately reflects my personal performance of the history, physical exam, assessment and plan.

## 2024-01-04 NOTE — PHYSICAL EXAM
[de-identified] :  Patient is WDWN, alert, and in no acute distress. Breathing is unlabored. He is grossly oriented to person, place, and time.  Right Hand:   Tenderness is present  Edema is present  No deformity Limited ROM  Sensation intact in all digits   [de-identified] : No imaging done today (01/02/24)

## 2024-01-04 NOTE — REVIEW OF SYSTEMS
[Negative] : Allergic/Immunologic [Joint Pain] : joint pain [FreeTextEntry9] : Finger Pain - Right Small Digit

## 2024-01-10 LAB
BASOPHILS # BLD AUTO: 0.04 K/UL
BASOPHILS NFR BLD AUTO: 0.9 %
CRP SERPL-MCNC: <3 MG/L
EOSINOPHIL # BLD AUTO: 0.25 K/UL
EOSINOPHIL NFR BLD AUTO: 5.8 %
ERYTHROCYTE [SEDIMENTATION RATE] IN BLOOD BY WESTERGREN METHOD: 13 MM/HR
HCT VFR BLD CALC: 42.6 %
HGB BLD-MCNC: 14 G/DL
IMM GRANULOCYTES NFR BLD AUTO: 0.2 %
LYMPHOCYTES # BLD AUTO: 1.1 K/UL
LYMPHOCYTES NFR BLD AUTO: 25.3 %
MAN DIFF?: NORMAL
MCHC RBC-ENTMCNC: 30.7 PG
MCHC RBC-ENTMCNC: 32.9 GM/DL
MCV RBC AUTO: 93.4 FL
MONOCYTES # BLD AUTO: 0.52 K/UL
MONOCYTES NFR BLD AUTO: 12 %
NEUTROPHILS # BLD AUTO: 2.42 K/UL
NEUTROPHILS NFR BLD AUTO: 55.8 %
PLATELET # BLD AUTO: 229 K/UL
RBC # BLD: 4.56 M/UL
RBC # FLD: 13.2 %
WBC # FLD AUTO: 4.34 K/UL

## 2024-01-16 ENCOUNTER — APPOINTMENT (OUTPATIENT)
Dept: ORTHOPEDIC SURGERY | Facility: CLINIC | Age: 79
End: 2024-01-16
Payer: MEDICARE

## 2024-01-16 DIAGNOSIS — M79.644 PAIN IN RIGHT FINGER(S): ICD-10-CM

## 2024-01-16 PROCEDURE — 99213 OFFICE O/P EST LOW 20 MIN: CPT

## 2024-01-30 ENCOUNTER — APPOINTMENT (OUTPATIENT)
Dept: ORTHOPEDIC SURGERY | Facility: CLINIC | Age: 79
End: 2024-01-30

## 2024-03-14 ENCOUNTER — APPOINTMENT (OUTPATIENT)
Dept: NEUROLOGY | Facility: CLINIC | Age: 79
End: 2024-03-14
Payer: MEDICARE

## 2024-03-14 VITALS
BODY MASS INDEX: 29.88 KG/M2 | DIASTOLIC BLOOD PRESSURE: 86 MMHG | HEART RATE: 65 BPM | HEIGHT: 64 IN | WEIGHT: 175 LBS | SYSTOLIC BLOOD PRESSURE: 159 MMHG

## 2024-03-14 DIAGNOSIS — M48.062 SPINAL STENOSIS, LUMBAR REGION WITH NEUROGENIC CLAUDICATION: ICD-10-CM

## 2024-03-14 DIAGNOSIS — R42 DIZZINESS AND GIDDINESS: ICD-10-CM

## 2024-03-14 PROCEDURE — 99213 OFFICE O/P EST LOW 20 MIN: CPT

## 2024-03-14 NOTE — PHYSICAL EXAM
[FreeTextEntry1] : Constitutional:  Patient was well-developed, well-nourished and in no acute distress.   Head:  Normocephalic, atraumatic. Tympanic membranes were clear.   Neck:  Supple with full range of motion.   Cardiovascular:  Cardiac rhythm was regular without murmur. There were no carotid bruits. Peripheral pulses were full and symmetric.   Respiratory:  Lungs were clear.   Abdomen:  Soft and nontender.   Spine:  Nontender.  There was no pain on straight leg raising and Wade's maneuver was negative.   Skin:  There were no rashes.   NEUROLOGICAL EXAMINATION:  Mental Status: He was alert and oriented. Speech was fluent. There was no dysarthria.   Cranial Nerves:   II: Pupils were surgical.  He could finger count bilaterally.   Visual fields were full.  Fundi were normal.  III, IV, VI:  Eye movements were full without nystagmus.   V: Facial sensation was intact.   VII: Facial strength was normal.   VIII: Hearing was diminished bilaterally.   IX, X: Palatal movement was normal. Phonation was normal.   XI: Sternocleidomastoids and trapezii were normal.   XII: Tongue was midline and movements normal. There was no lingual atrophy or fasciculations.   Motor Examination: Muscle bulk, tone and strength were normal.  He was able to stand on his toes and heels without difficulty.  Sensory Examination: Pinprick, vibration and joint position sense were intact.   Reflexes: DTRs were 2 throughout except for the ankle jerks which were 1+ on the right and absent on the left.  Plantar Responses: Plantar responses were flexor.   Coordination/Cerebellar Function: There was no dysmetria on finger to nose or heel to shin testing.   Gait/Stance: Gait was normal. Romberg was negative. Tandem was mildly unsteady.

## 2024-03-14 NOTE — HISTORY OF PRESENT ILLNESS
[FreeTextEntry1] : Mr. Nick Rainey returned to the office having been last seen on June 2, 2023. He is a 78-year-old right-handed gentleman with lumbar and cervical spondylosis.  MRI of the lumbar spine revealed severe stenosis at L4-5.  There was compression particularly of the left L5 nerve root.    Since his November 2022 visit, he had been evaluated by vestibular therapy.  He reported resolution of his dizziness.  He did have a tendency to drift towards his left.  He was hearing impaired.  He denied any neck pain.  He had occasional low back pain in the morning.  He denied sciatica.  He complained of nocturia x3.  He denied pedal complaints.  He reports of mild imbalance tending to fall towards his left side.  He has occasional low back pressure.  He exercises frequently.  He does not undergo formal physical therapy.  He feels that he is doing very well.  Medications include atorvastatin, dutasteride, tamsulosin, losartan and Metformin.  Past surgical history is notable for cataract extractions and TURP.  He suffers of hypertension, hyperlipidemia, glaucoma and diabetes.  He has no drug allergies.  He is a former smoker and social drinker.  He is .  Family history is notable for ovarian cancer and cardiac disease.

## 2024-03-14 NOTE — ASSESSMENT
[FreeTextEntry1] : Mr. Rainey is a 78-year-old who suffers from cervical and lumbar spondylosis and vestibulopathy.  He has mild residual vestibular difficulties and mild chronic recurrent back pain.  No new intervention is indicated.  I suggested a routine follow-up visit in 9 to 12 months.  Telephone contact will be maintained in the meantime.

## 2024-06-27 ENCOUNTER — OUTPATIENT (OUTPATIENT)
Dept: OUTPATIENT SERVICES | Facility: HOSPITAL | Age: 79
LOS: 1 days | End: 2024-06-27
Payer: MEDICARE

## 2024-06-27 ENCOUNTER — APPOINTMENT (OUTPATIENT)
Dept: ULTRASOUND IMAGING | Facility: HOSPITAL | Age: 79
End: 2024-06-27
Payer: MEDICARE

## 2024-06-27 DIAGNOSIS — Z00.8 ENCOUNTER FOR OTHER GENERAL EXAMINATION: ICD-10-CM

## 2024-06-27 PROCEDURE — 76536 US EXAM OF HEAD AND NECK: CPT

## 2024-06-27 PROCEDURE — 76536 US EXAM OF HEAD AND NECK: CPT | Mod: 26

## 2025-02-11 ENCOUNTER — APPOINTMENT (OUTPATIENT)
Dept: NEUROLOGY | Facility: CLINIC | Age: 80
End: 2025-02-11
Payer: MEDICARE

## 2025-02-11 VITALS
BODY MASS INDEX: 31.58 KG/M2 | WEIGHT: 176 LBS | DIASTOLIC BLOOD PRESSURE: 74 MMHG | SYSTOLIC BLOOD PRESSURE: 134 MMHG | HEART RATE: 70 BPM | HEIGHT: 62.5 IN

## 2025-02-11 DIAGNOSIS — M25.552 PAIN IN LEFT HIP: ICD-10-CM

## 2025-02-11 DIAGNOSIS — M54.50 LOW BACK PAIN, UNSPECIFIED: ICD-10-CM

## 2025-02-11 PROCEDURE — 99213 OFFICE O/P EST LOW 20 MIN: CPT

## 2025-02-12 ENCOUNTER — APPOINTMENT (OUTPATIENT)
Dept: RADIOLOGY | Facility: HOSPITAL | Age: 80
End: 2025-02-12
Payer: MEDICARE

## 2025-02-12 ENCOUNTER — APPOINTMENT (OUTPATIENT)
Dept: MRI IMAGING | Facility: HOSPITAL | Age: 80
End: 2025-02-12
Payer: MEDICARE

## 2025-02-12 ENCOUNTER — OUTPATIENT (OUTPATIENT)
Dept: OUTPATIENT SERVICES | Facility: HOSPITAL | Age: 80
LOS: 1 days | End: 2025-02-12
Payer: MEDICARE

## 2025-02-12 DIAGNOSIS — M25.552 PAIN IN LEFT HIP: ICD-10-CM

## 2025-02-12 DIAGNOSIS — M54.50 LOW BACK PAIN, UNSPECIFIED: ICD-10-CM

## 2025-02-12 PROCEDURE — 72100 X-RAY EXAM L-S SPINE 2/3 VWS: CPT | Mod: 26

## 2025-02-12 PROCEDURE — 72148 MRI LUMBAR SPINE W/O DYE: CPT | Mod: 26

## 2025-02-12 PROCEDURE — 72100 X-RAY EXAM L-S SPINE 2/3 VWS: CPT

## 2025-02-12 PROCEDURE — 73521 X-RAY EXAM HIPS BI 2 VIEWS: CPT | Mod: 26

## 2025-02-12 PROCEDURE — 72148 MRI LUMBAR SPINE W/O DYE: CPT

## 2025-02-12 PROCEDURE — 73521 X-RAY EXAM HIPS BI 2 VIEWS: CPT

## 2025-02-14 ENCOUNTER — NON-APPOINTMENT (OUTPATIENT)
Age: 80
End: 2025-02-14

## 2025-02-19 DIAGNOSIS — M89.8X8 OTHER SPECIFIED DISORDERS OF BONE, OTHER SITE: ICD-10-CM

## 2025-02-20 ENCOUNTER — APPOINTMENT (OUTPATIENT)
Dept: MRI IMAGING | Facility: HOSPITAL | Age: 80
End: 2025-02-20
Payer: MEDICARE

## 2025-02-20 ENCOUNTER — OUTPATIENT (OUTPATIENT)
Dept: OUTPATIENT SERVICES | Facility: HOSPITAL | Age: 80
LOS: 1 days | End: 2025-02-20
Payer: MEDICARE

## 2025-02-20 DIAGNOSIS — M89.8X8 OTHER SPECIFIED DISORDERS OF BONE, OTHER SITE: ICD-10-CM

## 2025-02-20 PROCEDURE — 72195 MRI PELVIS W/O DYE: CPT | Mod: 26

## 2025-02-20 PROCEDURE — 72195 MRI PELVIS W/O DYE: CPT

## 2025-02-25 ENCOUNTER — NON-APPOINTMENT (OUTPATIENT)
Age: 80
End: 2025-02-25

## 2025-03-19 ENCOUNTER — EMERGENCY (EMERGENCY)
Facility: HOSPITAL | Age: 80
LOS: 1 days | Discharge: HOME | End: 2025-03-19
Attending: STUDENT IN AN ORGANIZED HEALTH CARE EDUCATION/TRAINING PROGRAM | Admitting: STUDENT IN AN ORGANIZED HEALTH CARE EDUCATION/TRAINING PROGRAM
Payer: MEDICARE

## 2025-03-19 VITALS
WEIGHT: 173.94 LBS | OXYGEN SATURATION: 96 % | HEIGHT: 64 IN | DIASTOLIC BLOOD PRESSURE: 84 MMHG | TEMPERATURE: 98 F | RESPIRATION RATE: 16 BRPM | SYSTOLIC BLOOD PRESSURE: 158 MMHG | HEART RATE: 61 BPM

## 2025-03-19 LAB
ALBUMIN SERPL ELPH-MCNC: 3.6 G/DL — SIGNIFICANT CHANGE UP (ref 3.3–5)
ALP SERPL-CCNC: 89 U/L — SIGNIFICANT CHANGE UP (ref 40–120)
ALT FLD-CCNC: 27 U/L — SIGNIFICANT CHANGE UP (ref 10–45)
ANION GAP SERPL CALC-SCNC: 7 MMOL/L — SIGNIFICANT CHANGE UP (ref 5–17)
APTT BLD: 36 SEC — HIGH (ref 24.5–35.6)
AST SERPL-CCNC: 18 U/L — SIGNIFICANT CHANGE UP (ref 10–40)
BASOPHILS # BLD AUTO: 0.05 K/UL — SIGNIFICANT CHANGE UP (ref 0–0.2)
BASOPHILS NFR BLD AUTO: 0.8 % — SIGNIFICANT CHANGE UP (ref 0–2)
BILIRUB SERPL-MCNC: 0.7 MG/DL — SIGNIFICANT CHANGE UP (ref 0.2–1.2)
BUN SERPL-MCNC: 15 MG/DL — SIGNIFICANT CHANGE UP (ref 7–23)
CALCIUM SERPL-MCNC: 9.7 MG/DL — SIGNIFICANT CHANGE UP (ref 8.4–10.5)
CHLORIDE SERPL-SCNC: 105 MMOL/L — SIGNIFICANT CHANGE UP (ref 96–108)
CO2 SERPL-SCNC: 27 MMOL/L — SIGNIFICANT CHANGE UP (ref 22–31)
CREAT SERPL-MCNC: 1.03 MG/DL — SIGNIFICANT CHANGE UP (ref 0.5–1.3)
EGFR: 74 ML/MIN/1.73M2 — SIGNIFICANT CHANGE UP
EGFR: 74 ML/MIN/1.73M2 — SIGNIFICANT CHANGE UP
EOSINOPHIL # BLD AUTO: 1.05 K/UL — HIGH (ref 0–0.5)
EOSINOPHIL NFR BLD AUTO: 16.5 % — HIGH (ref 0–6)
GLUCOSE SERPL-MCNC: 134 MG/DL — HIGH (ref 70–99)
HCT VFR BLD CALC: 41.9 % — SIGNIFICANT CHANGE UP (ref 39–50)
HGB BLD-MCNC: 13.9 G/DL — SIGNIFICANT CHANGE UP (ref 13–17)
IMM GRANULOCYTES NFR BLD AUTO: 0.2 % — SIGNIFICANT CHANGE UP (ref 0–0.9)
INR BLD: 0.97 RATIO — SIGNIFICANT CHANGE UP (ref 0.85–1.16)
LIDOCAIN IGE QN: 32 U/L — SIGNIFICANT CHANGE UP (ref 16–77)
LYMPHOCYTES # BLD AUTO: 1.09 K/UL — SIGNIFICANT CHANGE UP (ref 1–3.3)
LYMPHOCYTES # BLD AUTO: 17.1 % — SIGNIFICANT CHANGE UP (ref 13–44)
MCHC RBC-ENTMCNC: 31 PG — SIGNIFICANT CHANGE UP (ref 27–34)
MCHC RBC-ENTMCNC: 33.2 G/DL — SIGNIFICANT CHANGE UP (ref 32–36)
MCV RBC AUTO: 93.3 FL — SIGNIFICANT CHANGE UP (ref 80–100)
MONOCYTES # BLD AUTO: 0.5 K/UL — SIGNIFICANT CHANGE UP (ref 0–0.9)
MONOCYTES NFR BLD AUTO: 7.8 % — SIGNIFICANT CHANGE UP (ref 2–14)
NEUTROPHILS # BLD AUTO: 3.67 K/UL — SIGNIFICANT CHANGE UP (ref 1.8–7.4)
NEUTROPHILS NFR BLD AUTO: 57.6 % — SIGNIFICANT CHANGE UP (ref 43–77)
NRBC BLD AUTO-RTO: 0 /100 WBCS — SIGNIFICANT CHANGE UP (ref 0–0)
PLATELET # BLD AUTO: 231 K/UL — SIGNIFICANT CHANGE UP (ref 150–400)
POTASSIUM SERPL-MCNC: 4.4 MMOL/L — SIGNIFICANT CHANGE UP (ref 3.5–5.3)
POTASSIUM SERPL-SCNC: 4.4 MMOL/L — SIGNIFICANT CHANGE UP (ref 3.5–5.3)
PROT SERPL-MCNC: 7.1 G/DL — SIGNIFICANT CHANGE UP (ref 6–8.3)
PROTHROM AB SERPL-ACNC: 11.4 SEC — SIGNIFICANT CHANGE UP (ref 9.9–13.4)
RBC # BLD: 4.49 M/UL — SIGNIFICANT CHANGE UP (ref 4.2–5.8)
RBC # FLD: 14.2 % — SIGNIFICANT CHANGE UP (ref 10.3–14.5)
SODIUM SERPL-SCNC: 139 MMOL/L — SIGNIFICANT CHANGE UP (ref 135–145)
WBC # BLD: 6.37 K/UL — SIGNIFICANT CHANGE UP (ref 3.8–10.5)
WBC # FLD AUTO: 6.37 K/UL — SIGNIFICANT CHANGE UP (ref 3.8–10.5)

## 2025-03-19 PROCEDURE — 36415 COLL VENOUS BLD VENIPUNCTURE: CPT

## 2025-03-19 PROCEDURE — 85730 THROMBOPLASTIN TIME PARTIAL: CPT

## 2025-03-19 PROCEDURE — 85610 PROTHROMBIN TIME: CPT

## 2025-03-19 PROCEDURE — 85025 COMPLETE CBC W/AUTO DIFF WBC: CPT

## 2025-03-19 PROCEDURE — 99284 EMERGENCY DEPT VISIT MOD MDM: CPT

## 2025-03-19 PROCEDURE — 80053 COMPREHEN METABOLIC PANEL: CPT

## 2025-03-19 PROCEDURE — 99283 EMERGENCY DEPT VISIT LOW MDM: CPT

## 2025-03-19 PROCEDURE — 83690 ASSAY OF LIPASE: CPT

## 2025-03-19 RX ORDER — HYDROCORTISONE 10 MG/G
1 CREAM TOPICAL
Qty: 1 | Refills: 0
Start: 2025-03-19 | End: 2025-04-01

## 2025-03-19 RX ADMIN — Medication 1000 MILLILITER(S): at 10:07

## 2025-03-19 NOTE — CHART NOTE - NSCHARTNOTEFT_GEN_A_CORE
79 y o male complaining of rectal bleeding as per chart.  SW met with patient to assist with scheduling the recommended gastroenterology follow up appointment.  Patient stated that Dr. Santi Raza is his gastroenterologist, and he will schedule the appointment himself.

## 2025-03-19 NOTE — ED ADULT NURSE NOTE - OBJECTIVE STATEMENT
79 yr old male to ED for complaints 2 bloody stools today. Patient states he was sent by Dr. Jean. Patient denies nausea, vomiting, abdominal pain, fevers, chills.

## 2025-03-19 NOTE — ED PROVIDER NOTE - NSFOLLOWUPINSTRUCTIONS_ED_ALL_ED_FT
Hemorrhoids    WHAT YOU NEED TO KNOW:    Hemorrhoids are swollen blood vessels inside your rectum (internal hemorrhoids) or on your anus (external hemorrhoids). Sometimes a hemorrhoid may prolapse. This means it extends out of your anus.    DISCHARGE INSTRUCTIONS:    Return to the emergency department if:   •You have severe pain in your rectum or around your anus.      •You have severe pain in your abdomen and you are vomiting.       •You have bleeding from your anus that soaks through your underwear.       Contact your healthcare provider if:   •You have frequent and painful bowel movements.      •Your hemorrhoid looks or feels more swollen than usual.       •You do not have a bowel movement for 2 days or more.       •You see or feel tissue coming through your anus.       •You have questions or concerns about your condition or care.      Medicines: You may need any of the following:   •A pad, cream, or ointment can help decrease pain, swelling, and itching.       •Stool softeners help treat or prevent constipation.       •NSAIDs, such as ibuprofen, help decrease swelling, pain, and fever. NSAIDs can cause stomach bleeding or kidney problems in certain people. If you take blood thinner medicine, always ask your healthcare provider if NSAIDs are safe for you. Always read the medicine label and follow directions.      •Take your medicine as directed. Contact your healthcare provider if you think your medicine is not helping or if you have side effects. Tell him or her if you are allergic to any medicine. Keep a list of the medicines, vitamins, and herbs you take. Include the amounts, and when and why you take them. Bring the list or the pill bottles to follow-up visits. Carry your medicine list with you in case of an emergency.      Manage your symptoms:   •Apply ice on your anus for 15 to 20 minutes every hour or as directed. Use an ice pack, or put crushed ice in a plastic bag. Cover it with a towel before you apply it to your anus. Ice helps prevent tissue damage and decreases swelling and pain.      •Take a sitz bath. Fill a bathtub with 4 to 6 inches of warm water. You may also use a sitz bath pan that fits inside a toilet bowl. Sit in the sitz bath for 15 minutes. Do this 3 times a day, and after each bowel movement. The warm water can help decrease pain and swelling.       •Keep your anal area clean. Gently wash the area with warm water daily. Soap may irritate the area. After a bowel movement, wipe with moist towelettes or wet toilet paper. Dry toilet paper can irritate the area.       Prevent hemorrhoids:   •Do not strain to have a bowel movement. Do not sit on the toilet too long. These actions can increase pressure on the tissues in your rectum and anus.       •Drink plenty of liquids. Liquids can help prevent constipation. Ask how much liquid to drink each day and which liquids are best for you.       •Eat a variety of high-fiber foods. Examples include fruits, vegetables, and whole grains. Ask your healthcare provider how much fiber you need each day. You may need to take a fiber supplement.              •Exercise as directed. Exercise, such as walking, may make it easier to have a bowel movement. Ask your healthcare provider to help you create an exercise plan.       •Do not have anal sex. Anal sex can weaken the skin around your rectum and anus.       •Avoid heavy lifting. This can cause straining and increase your risk for another hemorrhoid.       Follow up with your doctor as directed: Write down your questions so you remember to ask them during your visits.        © Copyright [facility] 2021           back to top                          © Copyright [facility] 2021

## 2025-03-19 NOTE — ED PROVIDER NOTE - PATIENT PORTAL LINK FT
You can access the FollowMyHealth Patient Portal offered by [facility] by registering at the following website: [email or web address]. By joining [facility]’s FollowMyHealth portal, you will also be able to view your health information using other applications (apps) compatible with our system.

## 2025-03-19 NOTE — ED ADULT NURSE NOTE - NSFALLUNIVINTERV_ED_ALL_ED
Bed/Stretcher in lowest position, wheels locked, appropriate side rails in place/Call bell, personal items and telephone in reach/Instruct patient to call for assistance before getting out of bed/chair/stretcher/Non-slip footwear applied when patient is off stretcher/[place] to call system/Physically safe environment - no spills, clutter or unnecessary equipment/Purposeful proactive rounding/Room/bathroom lighting operational, light cord in reach

## 2025-03-19 NOTE — ED PROVIDER NOTE - CLINICAL SUMMARY MEDICAL DECISION MAKING FREE TEXT BOX
79-year-old male with past medical history of type 2 diabetes, hypertension, BPH presented to ED with 2 episodes of bright red blood per rectum, patient reports brown stool, denies any abdominal pain denies any reported history of peptic ulcer disease, reports colonoscopy 2 years ago with noted polyps otherwise normal.  Patient denies any chest pain shortness of breath or lightheadedness.  Reports feeling well otherwise.  Rectal examination with bedside RN Silvana with noted external hemorrhoids, brown stool, likely hemorrhoids as cause of bleeding, will send hemorrhoidal cream to preferred pharmacy follow-up with outpatient gastroenterology return questions.